# Patient Record
Sex: MALE | Race: ASIAN | NOT HISPANIC OR LATINO | Employment: OTHER | URBAN - METROPOLITAN AREA
[De-identification: names, ages, dates, MRNs, and addresses within clinical notes are randomized per-mention and may not be internally consistent; named-entity substitution may affect disease eponyms.]

---

## 2018-08-06 ENCOUNTER — AMBULATORY - HEALTHEAST (OUTPATIENT)
Dept: SLEEP MEDICINE | Facility: CLINIC | Age: 58
End: 2018-08-06

## 2018-08-06 DIAGNOSIS — I71.40 ABDOMINAL AORTIC ANEURYSM (H): ICD-10-CM

## 2018-08-06 DIAGNOSIS — R06.83 SNORING: ICD-10-CM

## 2018-09-25 ENCOUNTER — RECORDS - HEALTHEAST (OUTPATIENT)
Dept: ADMINISTRATIVE | Facility: OTHER | Age: 58
End: 2018-09-25

## 2018-09-25 ENCOUNTER — AMBULATORY - HEALTHEAST (OUTPATIENT)
Dept: CARDIOLOGY | Facility: CLINIC | Age: 58
End: 2018-09-25

## 2018-09-28 ENCOUNTER — OFFICE VISIT - HEALTHEAST (OUTPATIENT)
Dept: SLEEP MEDICINE | Facility: CLINIC | Age: 58
End: 2018-09-28

## 2018-09-28 ENCOUNTER — OFFICE VISIT - HEALTHEAST (OUTPATIENT)
Dept: FAMILY MEDICINE | Facility: CLINIC | Age: 58
End: 2018-09-28

## 2018-09-28 DIAGNOSIS — R29.818 SUSPECTED SLEEP APNEA: ICD-10-CM

## 2018-09-28 DIAGNOSIS — G47.8 SLEEP DYSFUNCTION WITH SLEEP STAGE DISTURBANCE: ICD-10-CM

## 2018-09-28 DIAGNOSIS — R03.0 ELEVATED BLOOD PRESSURE READING: ICD-10-CM

## 2018-09-28 DIAGNOSIS — R06.83 SNORING: ICD-10-CM

## 2018-09-28 LAB
ANION GAP SERPL CALCULATED.3IONS-SCNC: 14 MMOL/L (ref 5–18)
BUN SERPL-MCNC: 17 MG/DL (ref 8–22)
CHLORIDE BLD-SCNC: 103 MMOL/L (ref 98–107)
CO2 SERPL-SCNC: 26 MMOL/L (ref 22–31)
CREAT SERPL-MCNC: 1.2 MG/DL (ref 0.8–1.5)
GLUCOSE BLD-MCNC: 106 MG/DL (ref 70–125)
POTASSIUM BLD-SCNC: 3.8 MMOL/L (ref 3.5–5.5)
SODIUM SERPL-SCNC: 143 MMOL/L (ref 136–145)

## 2018-09-28 RX ORDER — BLOOD PRESSURE TEST KIT
KIT MISCELLANEOUS
Qty: 1 EACH | Refills: 0 | Status: SHIPPED | OUTPATIENT
Start: 2018-09-28

## 2018-09-28 ASSESSMENT — MIFFLIN-ST. JEOR: SCORE: 1602.8

## 2018-10-01 ENCOUNTER — OFFICE VISIT - HEALTHEAST (OUTPATIENT)
Dept: CARDIOLOGY | Facility: CLINIC | Age: 58
End: 2018-10-01

## 2018-10-01 DIAGNOSIS — I10 ESSENTIAL HYPERTENSION, BENIGN: ICD-10-CM

## 2018-10-01 DIAGNOSIS — I71.20 THORACIC AORTIC ANEURYSM WITHOUT RUPTURE (H): ICD-10-CM

## 2018-10-01 ASSESSMENT — MIFFLIN-ST. JEOR: SCORE: 1610.51

## 2018-11-02 ENCOUNTER — OFFICE VISIT - HEALTHEAST (OUTPATIENT)
Dept: CARDIOLOGY | Facility: CLINIC | Age: 58
End: 2018-11-02

## 2018-11-02 ENCOUNTER — RECORDS - HEALTHEAST (OUTPATIENT)
Dept: ADMINISTRATIVE | Facility: OTHER | Age: 58
End: 2018-11-02

## 2018-11-02 DIAGNOSIS — R03.0 ELEVATED BLOOD PRESSURE READING: ICD-10-CM

## 2018-11-02 DIAGNOSIS — I71.20 THORACIC AORTIC ANEURYSM WITHOUT RUPTURE (H): ICD-10-CM

## 2018-11-02 DIAGNOSIS — I10 ESSENTIAL HYPERTENSION, BENIGN: ICD-10-CM

## 2018-11-02 RX ORDER — LISINOPRIL 10 MG/1
10 TABLET ORAL DAILY
Qty: 90 TABLET | Refills: 3 | Status: SHIPPED | OUTPATIENT
Start: 2018-11-02 | End: 2022-02-04

## 2018-11-02 RX ORDER — METOPROLOL SUCCINATE 25 MG/1
25 TABLET, EXTENDED RELEASE ORAL DAILY
Qty: 90 TABLET | Refills: 3 | Status: SHIPPED | OUTPATIENT
Start: 2018-11-02 | End: 2022-02-04

## 2018-11-02 RX ORDER — AMLODIPINE BESYLATE 10 MG/1
10 TABLET ORAL DAILY
Qty: 90 TABLET | Refills: 3 | Status: SHIPPED | OUTPATIENT
Start: 2018-11-02

## 2018-11-02 ASSESSMENT — MIFFLIN-ST. JEOR: SCORE: 1592.37

## 2018-11-03 ENCOUNTER — RECORDS - HEALTHEAST (OUTPATIENT)
Dept: SLEEP MEDICINE | Facility: CLINIC | Age: 58
End: 2018-11-03

## 2018-11-03 ENCOUNTER — RECORDS - HEALTHEAST (OUTPATIENT)
Dept: ADMINISTRATIVE | Facility: OTHER | Age: 58
End: 2018-11-03

## 2018-11-03 DIAGNOSIS — R29.818 OTHER SYMPTOMS AND SIGNS INVOLVING THE NERVOUS SYSTEM: ICD-10-CM

## 2018-11-03 DIAGNOSIS — R06.83 SNORING: ICD-10-CM

## 2018-11-03 DIAGNOSIS — G47.8 OTHER SLEEP DISORDERS: ICD-10-CM

## 2018-11-07 ENCOUNTER — COMMUNICATION - HEALTHEAST (OUTPATIENT)
Dept: SLEEP MEDICINE | Facility: CLINIC | Age: 58
End: 2018-11-07

## 2018-11-07 DIAGNOSIS — G47.33 OSA (OBSTRUCTIVE SLEEP APNEA): ICD-10-CM

## 2018-11-14 ENCOUNTER — COMMUNICATION - HEALTHEAST (OUTPATIENT)
Dept: SLEEP MEDICINE | Facility: CLINIC | Age: 58
End: 2018-11-14

## 2019-01-11 ENCOUNTER — OFFICE VISIT - HEALTHEAST (OUTPATIENT)
Dept: SLEEP MEDICINE | Facility: CLINIC | Age: 59
End: 2019-01-11

## 2019-01-11 ENCOUNTER — AMBULATORY - HEALTHEAST (OUTPATIENT)
Dept: SLEEP MEDICINE | Facility: CLINIC | Age: 59
End: 2019-01-11

## 2019-01-11 DIAGNOSIS — G47.33 OBSTRUCTIVE SLEEP APNEA: ICD-10-CM

## 2019-01-11 DIAGNOSIS — G47.8 SLEEP DYSFUNCTION WITH SLEEP STAGE DISTURBANCE: ICD-10-CM

## 2019-01-11 DIAGNOSIS — G47.10 HYPERSOMNIA: ICD-10-CM

## 2019-01-11 ASSESSMENT — MIFFLIN-ST. JEOR: SCORE: 1604.16

## 2019-01-25 ENCOUNTER — RECORDS - HEALTHEAST (OUTPATIENT)
Dept: LAB | Facility: CLINIC | Age: 59
End: 2019-01-25

## 2019-01-25 LAB
ALBUMIN SERPL-MCNC: 3.9 G/DL (ref 3.5–5)
ALP SERPL-CCNC: 85 U/L (ref 45–120)
ALT SERPL W P-5'-P-CCNC: 31 U/L (ref 0–45)
ANION GAP SERPL CALCULATED.3IONS-SCNC: 12 MMOL/L (ref 5–18)
AST SERPL W P-5'-P-CCNC: 27 U/L (ref 0–40)
BASOPHILS # BLD AUTO: 0.1 THOU/UL (ref 0–0.2)
BASOPHILS NFR BLD AUTO: 1 % (ref 0–2)
BILIRUB SERPL-MCNC: 0.5 MG/DL (ref 0–1)
BUN SERPL-MCNC: 15 MG/DL (ref 8–22)
CALCIUM SERPL-MCNC: 9.7 MG/DL (ref 8.5–10.5)
CHLORIDE BLD-SCNC: 105 MMOL/L (ref 98–107)
CO2 SERPL-SCNC: 26 MMOL/L (ref 22–31)
CREAT SERPL-MCNC: 1.01 MG/DL (ref 0.7–1.3)
EOSINOPHIL # BLD AUTO: 0.1 THOU/UL (ref 0–0.4)
EOSINOPHIL NFR BLD AUTO: 2 % (ref 0–6)
ERYTHROCYTE [DISTWIDTH] IN BLOOD BY AUTOMATED COUNT: 13.2 % (ref 11–14.5)
GFR SERPL CREATININE-BSD FRML MDRD: >60 ML/MIN/1.73M2
GLUCOSE BLD-MCNC: 97 MG/DL (ref 70–125)
HCT VFR BLD AUTO: 43.7 % (ref 40–54)
HGB BLD-MCNC: 14.4 G/DL (ref 14–18)
LYMPHOCYTES # BLD AUTO: 2.4 THOU/UL (ref 0.8–4.4)
LYMPHOCYTES NFR BLD AUTO: 27 % (ref 20–40)
MCH RBC QN AUTO: 31.3 PG (ref 27–34)
MCHC RBC AUTO-ENTMCNC: 33 G/DL (ref 32–36)
MCV RBC AUTO: 95 FL (ref 80–100)
MONOCYTES # BLD AUTO: 0.7 THOU/UL (ref 0–0.9)
MONOCYTES NFR BLD AUTO: 8 % (ref 2–10)
NEUTROPHILS # BLD AUTO: 5.4 THOU/UL (ref 2–7.7)
NEUTROPHILS NFR BLD AUTO: 62 % (ref 50–70)
PLATELET # BLD AUTO: NORMAL THOU/UL (ref 140–440)
PMV BLD AUTO: NORMAL FL (ref 8.5–12.5)
POTASSIUM BLD-SCNC: 3.9 MMOL/L (ref 3.5–5)
PROT SERPL-MCNC: 7.8 G/DL (ref 6–8)
RBC # BLD AUTO: 4.6 MILL/UL (ref 4.4–6.2)
SODIUM SERPL-SCNC: 143 MMOL/L (ref 136–145)
WBC: 8.7 THOU/UL (ref 4–11)

## 2019-01-29 ENCOUNTER — AMBULATORY - HEALTHEAST (OUTPATIENT)
Dept: CARDIOLOGY | Facility: CLINIC | Age: 59
End: 2019-01-29

## 2019-01-29 ENCOUNTER — RECORDS - HEALTHEAST (OUTPATIENT)
Dept: ADMINISTRATIVE | Facility: OTHER | Age: 59
End: 2019-01-29

## 2019-02-01 ENCOUNTER — AMBULATORY - HEALTHEAST (OUTPATIENT)
Dept: SLEEP MEDICINE | Facility: CLINIC | Age: 59
End: 2019-02-01

## 2019-03-01 ENCOUNTER — HOSPITAL ENCOUNTER (OUTPATIENT)
Dept: MRI IMAGING | Facility: HOSPITAL | Age: 59
Discharge: HOME | End: 2019-03-01
Attending: INTERNAL MEDICINE

## 2019-03-01 DIAGNOSIS — I71.20 THORACIC AORTIC ANEURYSM WITHOUT RUPTURE (H): ICD-10-CM

## 2019-03-01 DIAGNOSIS — I10 ESSENTIAL HYPERTENSION, BENIGN: ICD-10-CM

## 2019-03-05 LAB
CCTA EJECTION FRACTION: 74 %
CCTA INTERVENTRICULAR SETPUM: 1.1 CM (ref 0.6–1.1)
CCTA LEFT INTERNAL DIMENSION IN SYSTOLE: 2.6 CM (ref 2.1–4)
CCTA LEFT VENTRICULAR INTERNAL DIMENSION IN DIASTOLE: 4.6 CM (ref 3.5–6)
CCTA LEFT VENTRICULAR MASS: 181.22 G
CCTA POSTERIOR WALL: 1.1 CM (ref 0.6–1.1)
MR CARDIAC LEFT VENTRIAL CARDIAC INDEX: 2.5 L/MIN/M2 (ref 1.7–4.2)
MR CARDIAC LEFT VENTRICAL CARDIAC OUTPUT: 4.9 L/MIN (ref 2.8–8.8)
MR CARDIAC LEFT VENTRICULAR DIASTOLIC VOLUME INDEX: 63.47 ML/M2 (ref 47–92)
MR CARDIAC LEFT VENTRICULAR MASS INDEX: 61.87 G/M2 (ref 70–113)
MR CARDIAC LEFT VENTRICULAR MASS: 119.8 G (ref 118–238)
MR CARDIAC LEFT VENTRICULAR STROKE VOLUME INDEX: 42.81 ML/M2 (ref 32–62)
MR CARDIAC LEFT VENTRICULAR SYSTOLIC VOLUME INDEX: 20.66 ML/M2 (ref 13–30)
MR EJECTION FRACTION: 67.45 %
MR HEIGHT: 1.67 M
MR LEFT VENTRICULAR DYSTOLIC VOLUMEN: 122.9 ML (ref 77–195)
MR LEFT VENTRICULAR STROKE VOLUMEN: 82.9 ML (ref 51–133)
MR LEFT VENTRICULAR SYSTOLIC VOLUME: 40 ML (ref 19–72)
MR WEIGHT: 84.6 KG

## 2019-05-06 ENCOUNTER — OFFICE VISIT - HEALTHEAST (OUTPATIENT)
Dept: CARDIOLOGY | Facility: CLINIC | Age: 59
End: 2019-05-06

## 2019-05-10 ENCOUNTER — OFFICE VISIT - HEALTHEAST (OUTPATIENT)
Dept: SLEEP MEDICINE | Facility: CLINIC | Age: 59
End: 2019-05-10

## 2019-05-10 DIAGNOSIS — G47.8 SLEEP DYSFUNCTION WITH SLEEP STAGE DISTURBANCE: ICD-10-CM

## 2019-05-10 DIAGNOSIS — G47.33 OBSTRUCTIVE SLEEP APNEA: ICD-10-CM

## 2019-05-10 DIAGNOSIS — G47.10 HYPERSOMNIA: ICD-10-CM

## 2019-05-10 ASSESSMENT — MIFFLIN-ST. JEOR: SCORE: 1624.57

## 2021-02-26 ENCOUNTER — RECORDS - HEALTHEAST (OUTPATIENT)
Dept: LAB | Facility: CLINIC | Age: 61
End: 2021-02-26

## 2021-02-26 LAB
ALBUMIN SERPL-MCNC: 3.9 G/DL (ref 3.5–5)
ALP SERPL-CCNC: 71 U/L (ref 45–120)
ALT SERPL W P-5'-P-CCNC: 28 U/L (ref 0–45)
ANION GAP SERPL CALCULATED.3IONS-SCNC: 8 MMOL/L (ref 5–18)
AST SERPL W P-5'-P-CCNC: 23 U/L (ref 0–40)
BILIRUB SERPL-MCNC: 0.5 MG/DL (ref 0–1)
BUN SERPL-MCNC: 13 MG/DL (ref 8–22)
CALCIUM SERPL-MCNC: 9 MG/DL (ref 8.5–10.5)
CHLORIDE BLD-SCNC: 105 MMOL/L (ref 98–107)
CHOLEST SERPL-MCNC: 202 MG/DL
CO2 SERPL-SCNC: 27 MMOL/L (ref 22–31)
CREAT SERPL-MCNC: 1.09 MG/DL (ref 0.7–1.3)
FASTING STATUS PATIENT QL REPORTED: YES
GFR SERPL CREATININE-BSD FRML MDRD: >60 ML/MIN/1.73M2
GLUCOSE BLD-MCNC: 95 MG/DL (ref 70–125)
HDLC SERPL-MCNC: 44 MG/DL
LDLC SERPL CALC-MCNC: 135 MG/DL
POTASSIUM BLD-SCNC: 3.8 MMOL/L (ref 3.5–5)
PROT SERPL-MCNC: 7.3 G/DL (ref 6–8)
PSA SERPL-MCNC: 0.7 NG/ML (ref 0–3.5)
SODIUM SERPL-SCNC: 140 MMOL/L (ref 136–145)
TRIGL SERPL-MCNC: 115 MG/DL

## 2021-03-01 LAB — 25(OH)D3 SERPL-MCNC: 12.4 NG/ML (ref 30–80)

## 2021-03-05 ENCOUNTER — RECORDS - HEALTHEAST (OUTPATIENT)
Dept: LAB | Facility: CLINIC | Age: 61
End: 2021-03-05

## 2021-03-06 LAB — BACTERIA SPEC CULT: NO GROWTH

## 2021-03-09 LAB
C TRACH DNA SPEC QL PROBE+SIG AMP: NEGATIVE
N GONORRHOEA DNA SPEC QL NAA+PROBE: NEGATIVE

## 2021-05-28 NOTE — PROGRESS NOTES
Dear Dr. Briseno, Sharda Liu MD  [address] [place] [id number],    Thank you for the opportunity to participate in the care of Marcelle Posadas.     He is a 58 y.o. y/o male patient who comes to the sleep medicine clinic for follow up.  This is the patient's first clinical visit since starting CPAP therapy.  The patient states that his sleep quality and energy level have both dramatically improved since starting CPAP.  His wife and children are also thankful that he no longer snores.    Compliance Download data for 30 days:  Pressure setting: APAP 8-16 CWP  Residual AHI: 0.9 events per hour  Leak: Minimal  Compliance: 63%  Mask Tolerance: Good  Skin irritation: None    Past Medical History:   Diagnosis Date     Ascending aortic aneurysm (H)      Hypertension        History reviewed. No pertinent surgical history.    Social History     Socioeconomic History     Marital status:      Spouse name: Not on file     Number of children: Not on file     Years of education: Not on file     Highest education level: Not on file   Occupational History     Not on file   Social Needs     Financial resource strain: Not on file     Food insecurity:     Worry: Not on file     Inability: Not on file     Transportation needs:     Medical: Not on file     Non-medical: Not on file   Tobacco Use     Smoking status: Never Smoker     Smokeless tobacco: Never Used   Substance and Sexual Activity     Alcohol use: Not on file     Drug use: Not on file     Sexual activity: Not on file   Lifestyle     Physical activity:     Days per week: Not on file     Minutes per session: Not on file     Stress: Not on file   Relationships     Social connections:     Talks on phone: Not on file     Gets together: Not on file     Attends [employer] service: Not on file     Active member of club or organization: Not on file     Attends meetings of clubs or organizations: Not on file     Relationship status: Not on file     Intimate partner violence:  "    Fear of current or ex partner: Not on file     Emotionally abused: Not on file     Physically abused: Not on file     Forced sexual activity: Not on file   Other Topics Concern     Not on file   Social History Narrative     Not on file       Current Outpatient Medications   Medication Sig Dispense Refill     amLODIPine (NORVASC) 10 MG tablet Take 1 tablet (10 mg total) by mouth daily. 90 tablet 3     blood pressure monitor Kit Check blood pressure 3 times a day 1 each 0     lisinopril (PRINIVIL,ZESTRIL) 10 MG tablet Take 1 tablet (10 mg total) by mouth daily. 90 tablet 3     metoprolol succinate (TOPROL XL) 25 MG Take 1 tablet (25 mg total) by mouth daily. 90 tablet 3     No current facility-administered medications for this visit.        No Known Allergies    Epworths Sleepiness Scale 9/28/2018 5/10/2019   Sitting and reading 1 1   Watching TV 0 1   Sitting, inactive in a public place (e.g. a theatre or a meeting) 1 1   As a passenger in a car for an hour without a break 0 1   Lying down to rest in the afternoon when circumstances permit 0 2   Sitting and talking to someone 1 0   Sitting quietly after a lunch without alcohol 0 1   In a car, while stopped for a few minutes in traffic 0 0   Total score 3 7       Physical Exam:  /70 (Patient Site: Right Arm, Patient Position: Sitting, Cuff Size: Adult Regular)   Pulse 62   Ht 5' 6\" (1.676 m)   Wt 191 lb 1.6 oz (86.7 kg)   SpO2 95%   BMI 30.84 kg/m    BMI:Body mass index is 30.84 kg/m .   GEN: NAD, obese  Psych: normal mood, normal affect    Labs/Studies:    I reviewed the efficacy and compliance report from his device. Data summarized on the HPI and the PAP compliance flow sheet.     Lab Results   Component Value Date    WBC 8.7 01/25/2019    HGB 14.4 01/25/2019    HCT 43.7 01/25/2019    MCV 95 01/25/2019    PLT  01/25/2019      Comment:      Clumped platelets; estimate from smear appears to be decreased          Chemistry        Component Value " Date/Time     01/25/2019 0900    K 3.9 01/25/2019 0900     01/25/2019 0900    CO2 26 01/25/2019 0900    BUN 15 01/25/2019 0900    CREATININE 1.01 01/25/2019 0900    GLU 97 01/25/2019 0900        Component Value Date/Time    CALCIUM 9.7 01/25/2019 0900    ALKPHOS 85 01/25/2019 0900    AST 27 01/25/2019 0900    ALT 31 01/25/2019 0900    BILITOT 0.5 01/25/2019 0900            No results found for: FERRITIN         Assessment and Plan:  In summary Marcelle Posadas is a 58 y.o. year old male who is here for follow-up.    1.  Obstructive sleep apnea on CPAP  I will narrow his CPAP pressure range to a set pressure of 10 CWP.  I had the patient test at this pressure setting in front of me and he felt it was comfortable.  I look forward to following up with the patient in 2 years.  2.  Hypersomnia  Improving  3.  Other sleep disturbance     Patient verbalized understanding of these issues, agrees with the plan and all questions were answered today. Patient was given an opportuntity to voice any other symptoms or concerns not listed above. Patient did not have any other symptoms or concerns.      Viktor Sellers DO  Board Certified in Internal Medicine and Sleep Medicine  [facility].      (Note created with Dragon voice recognition and unintended spelling errors and word substitutions may occur)     All the information was obtained through the help of the interpretor.

## 2021-06-02 VITALS — BODY MASS INDEX: 29.94 KG/M2 | HEIGHT: 66 IN | WEIGHT: 186.3 LBS

## 2021-06-02 VITALS — BODY MASS INDEX: 30.34 KG/M2 | HEIGHT: 66 IN | BODY MASS INDEX: 29.7 KG/M2 | WEIGHT: 188 LBS

## 2021-06-02 VITALS — HEIGHT: 66 IN | WEIGHT: 184 LBS | BODY MASS INDEX: 29.57 KG/M2

## 2021-06-02 VITALS — HEIGHT: 66 IN | WEIGHT: 186.6 LBS | BODY MASS INDEX: 29.99 KG/M2

## 2021-06-02 VITALS — WEIGHT: 188 LBS | BODY MASS INDEX: 30.22 KG/M2 | HEIGHT: 66 IN

## 2021-06-02 VITALS — WEIGHT: 185.1 LBS | BODY MASS INDEX: 29.88 KG/M2

## 2021-06-03 VITALS — WEIGHT: 191.1 LBS | HEIGHT: 66 IN | BODY MASS INDEX: 30.71 KG/M2

## 2021-06-16 PROBLEM — I71.20 THORACIC AORTIC ANEURYSM WITHOUT RUPTURE (H): Status: ACTIVE | Noted: 2018-10-01

## 2021-06-16 PROBLEM — I10 ESSENTIAL HYPERTENSION, BENIGN: Status: ACTIVE | Noted: 2018-10-01

## 2021-06-17 NOTE — PATIENT INSTRUCTIONS - HE
"Patient Instructions by Viktor Sellers DO at 1/11/2019  1:00 PM     Author: Viktor Sellers DO Service: -- Author Type: Physician    Filed: 1/11/2019  1:20 PM Encounter Date: 1/11/2019 Status: Addendum    : Viktor Sellers DO (Physician)    Related Notes: Original Note by Viktor Sellers DO (Physician) filed at 1/11/2019  1:09 PM         What is sleep apnea?    Sleep apnea is a condition that makes you stop breathing for short periods while you are asleep. There are 2 types of sleep apnea. One is called \"obstructive sleep apnea,\" and the other is called \"central sleep apnea.\"  In obstructive sleep apnea, you stop breathing because your throat narrows or closes (figure 1). In central sleep apnea, you stop breathing because your brain does not send the right signals to your muscles to make you breathe. When people talk about sleep apnea, they are usually referring to obstructive sleep apnea, which is what this article is about.  People with sleep apnea do not know that they stop breathing when they are asleep. But they do sometimes wake up startled or gasping for breath. They also often hear from loved ones that they snore.  What are the symptoms of sleep apnea? -- The main symptoms of sleep apnea are loud snoring, tiredness, and daytime sleepiness. Other symptoms can include:  ?Restless sleep  ?Waking up choking or gasping  ?Morning headaches, dry mouth, or sore throat  ?Waking up often to urinate  ?Waking up feeling unrested or groggy  ?Trouble thinking clearly or remembering things  Some people with sleep apnea don't have symptoms, or they don't know they have them. They might figure that it's normal to be tired or to snore a lot.  Should I see a doctor or nurse? -- Yes. If you think you might have sleep apnea, see your doctor.  Is there a test for sleep apnea? -- Yes. If your doctor or nurse suspects you have sleep apnea, he or she might send you for a \"sleep study.\" Sleep studies can sometimes be " "done at home, but they are usually done in a sleep lab. For the study, you spend the night in the lab, and you are hooked up to different machines that monitor your heart rate, breathing, and other body functions. The results of the test will tell your doctor or nurse if you have the disorder.  Is there anything I can do on my own to help my sleep apnea? -- Yes. Here are some things that might help:  ?Stay off your back when sleeping. (This is not always practical, because people cannot control their position while asleep. Plus, it only helps some people.)  ?Lose weight, if you are overweight  ?Avoid alcohol, because it can make sleep apnea worse  How is sleep apnea treated? -- The most effective treatment for sleep apnea is a device that keeps your airway open while you sleep. Treatment with this device is called \"continuous positive airway pressure,\" or CPAP. People getting CPAP wear a face mask at night that keeps them breathing (figure 2).  If your doctor or nurse recommends a CPAP machine, try to be patient about using it. The mask might seem uncomfortable to wear at first, and the machine might seem noisy, but using the machine can really pay off. People with sleep apnea who use a CPAP machine feel more rested and generally feel better.  There is also another device that you wear in your mouth called an \"oral appliance\" or \"mandibular advancement device.\" It also helps keep your airway open while you sleep.  In rare cases, when nothing else helps, doctors recommend surgery to keep the airway open. Surgery to do this is not always effective, and even when it is, the problem can come back.  Is sleep apnea dangerous? -- It can be. People with sleep apnea do not get good-quality sleep, so they are often tired and not alert. This puts them at risk for car accidents and other types of accidents. Plus, studies show that people with sleep apnea are more likely than others to have high blood pressure, heart attacks, " and other serious heart problems. In people with severe sleep apnea, getting treated (for example, with a CPAP machine) can help prevent some of these problems.    GRAPHICS  Airway in a person with sleep apnea    Normally when a person sleeps, the airway remains open, and air can pass from the nose and mouth to the lungs. In a person with sleep apnea, parts of the throat and mouth drop into the airway and block off the flow of air. This can cause loud snoring and interrupt breathing for short periods.  Graphic 43707 Version 5.0    Continuous positive airway pressure (CPAP) for sleep apnea    The CPAP mask gently blows air into your nose while you sleep. It puts just enough pressure on your airway to keep it from closing. The mask in this picture fits over just the nose. Other CPAP devices have masks that fit over the nose and mouth.    Equipment Instructions    We will process your PAP order and send it to a Durable Medical Equipment (DME) provider.    The medical equipment company should call you within 7 days.  If you have not heard from the company, please contact them to see if they received your order and are planning to call you.    Please call us at [phone] if you are unable to contact the medical equipment company or if they do not have the order.    If you are starting a new PAP machine, please call us after you use it the first night to let us know how it went. This call also helps us know that you received your equipment and that everything is ready. Please use our central phone number [phone]    Contact information for [employer] company:    [employer] Tel: [phone]    Please bring your machine, mask, hose and power cord on the next clinical visit with me. Thank you.

## 2021-06-17 NOTE — PATIENT INSTRUCTIONS - HE
"Patient Instructions by Viktor Sellers DO at 5/10/2019  9:00 AM     Author: Viktor Sellers DO Service: -- Author Type: Physician    Filed: 5/10/2019  9:06 AM Encounter Date: 5/10/2019 Status: Signed    : Viktor Sellers DO (Physician)         SLEEP HYGIENE    Sleep only as much as you need to feel rested and then get out of bed   Keep a regular sleep schedule   Avoid forcing sleep   Exercise regularly for at least 20 minutes, preferably 4 to 5 hours before bedtime   Avoid caffeinated beverages after lunch   Avoid alcohol near bedtime: no \"night cap\"   Avoid smoking, especially in the evening   Do not go to bed hungry   Adjust bedroom environment   Avoid prolonged use of light-emitting screens before bedtime    Deal with your worries before bedtime              "

## 2021-06-20 NOTE — PROGRESS NOTES
Assessment:     1. Elevated blood pressure reading  ISTAT CHEM 7 (HE CLINIC ONLY)    lisinopril (PRINIVIL,ZESTRIL) 10 MG tablet    hydroCHLOROthiazide (HYDRODIURIL) 25 MG tablet    amLODIPine (NORVASC) 5 MG tablet    blood pressure monitor Kit     Results for orders placed or performed in visit on 09/28/18   ISTAT CHEM 7 (HE CLINIC ONLY)   Result Value Ref Range    Sodium 143 136 - 145 mmol/L    Potassium 3.8 3.5 - 5.5 mmol/L    CO2 26 22 - 31 mmol/L    Chloride 103 98 - 107 mmol/L    Anion Gap, Calculation 14 5 - 18 mmol/L    Glucose 106 70 - 125 mg/dL    BUN 17 8 - 22 mg/dL    Creatinine 1.20 0.80 - 1.50 mg/dL            Plan:     Differential diagnosis include but not limited to elevated blood pressure without history of hypertension, hypertension.  Discussed with the patient about the findings, with his elevated blood pressure but he is a symptomatic.  I discussed with the patient about going to the emergency room.  Patient was resistant therefore I decided to start him on triple therapy for hypertension.  Advised patient that today we will start him on lisinopril 10 mg, hydrochlorothiazide 20 mg, and amlodipine 5 mg all of the medications daily.  Patient is to monitor for any side effects of the medications.  He need to be compliant with his medications.  I personally scheduled an appointment with him at entire clinic with Dr. He for follow-up.  I also ordered a blood pressure monitor machine for the patient he is advised to keep a log of his blood pressure and to take his blood pressure 3 times a day.  Advised patient that if he experiences any symptoms including visual problems, occipital headaches, or any weakness he need to go to the emergency room immediately.  Patient and his wife verbalized understanding the plan of care.     Subjective:       58 y.o. male presents for evaluation of elevated blood pressure.  Patient was referred to the walk-in clinic from the sleep clinic due to blood pressure  diastolic over 200.  Currently patient here in the clinic with elevated blood pressure.  He is a symptomatic, he denies any headaches, visual disturbance, no nausea, vomiting, or diarrhea.  Denies any shortness of breath.  Patient reports that he drinks 1 cup of coffee a day.  He is non-smoker, no family history of hypertension.  Reviewing patient blood pressure in 2015 he had elevated blood pressure but was never started on medication, he reports that his blood pressure was well controlled.  Currently he is not taking any medications.  And he denies any medical problems including diabetes, hyperlipidemia, hypertension.    The following portions of the patient's history were reviewed and updated as appropriate: allergies, current medications, past family history, past medical history, past social history, past surgical history and problem list.    Review of Systems  A 12 point comprehensive review of systems was negative except as noted.     Objective:      BP (!) 201/116 (Patient Site: Right Arm, Patient Position: Sitting, Cuff Size: Adult Large)  Pulse 68  Temp 98.9  F (37.2  C) (Oral)   Resp 20  Wt 185 lb 1.6 oz (84 kg)  SpO2 95%  BMI 29.88 kg/m2  General appearance: alert, appears stated age, cooperative and moderate distress  Head: Normocephalic, without obvious abnormality, atraumatic  Eyes: conjunctivae/corneas clear. PERRL, EOM's intact. Fundi benign.  Ears: normal TM's and external ear canals both ears  Throat: lips, mucosa, and tongue normal; teeth and gums normal  Lungs: clear to auscultation bilaterally  Heart: regular rate and rhythm, S1, S2 normal, no murmur, click, rub or gallop  Extremities: extremities normal, atraumatic, no cyanosis or edema  Pulses: 2+ and symmetric  Skin: Skin color, texture, turgor normal. No rashes or lesions  Lymph nodes: Cervical, supraclavicular, and axillary nodes normal.  Neurologic: Grossly normal     This note has been dictated using voice recognition software. Any  grammatical or context distortions are unintentional and inherent to the software

## 2021-06-20 NOTE — PROGRESS NOTES
Dear Dr. Sharda Briseno Md  [address] [place] [address]    Thank you for the opportunity to participate in the care of Mr. Marcelle Posadas.    He is a 58 y.o. male who comes to the clinic with a chief complaint of witnessed apnea that is been going on for at least 2 years.  While the patient denies any episodes of excessive daytime sleepiness he has been informed by his wife that he has significant pauses in his breathing during sleep followed by loud snoring.  The patient's blood pressure was excessively elevated during this clinic visit.  He currently denies any headaches, chest pain, shortness of breath, or double vision at this point in time.  The patient's other review of system is otherwise unremarkable.     Ideal Sleep-Wake Cycle(devoid of societal pressure):    Patient would try to initiate sleep at around 10 PM with a sleep latency of 10-15 minutes. The patient would have 2 nocturnal awakening. Final wake up time is around 7 AM.    Past Medical History  No past medical history on file.     Past Surgical History  No past surgical history on file.     Meds  No current outpatient prescriptions on file.     No current facility-administered medications for this visit.         Allergies  Review of patient's allergies indicates no known allergies.     Social History  Social History     Social History     Marital status:      Spouse name: N/A     Number of children: N/A     Years of education: N/A     Occupational History     Not on file.     Social History Main Topics     Smoking status: Never Smoker     Smokeless tobacco: Never Used     Alcohol use Not on file     Drug use: Not on file     Sexual activity: Not on file     Other Topics Concern     Not on file     Social History Narrative     No narrative on file        Family History  No family history on file.     Review of Systems:  Constitutional: Negative except as noted in HPI.   Eyes: Negative except as noted in HPI.   ENT: Negative except  "as noted in HPI.   Cardiovascular: Negative except as noted in HPI.   Respiratory: Negative except as noted in HPI.   Gastrointestinal: Negative except as noted in HPI.   Genitourinary: Negative except as noted in HPI.   Musculoskeletal: Negative except as noted in HPI.   Integumentary: Negative except as noted in HPI.   Neurological: Negative except as noted in HPI.   Psychiatric: Negative except as noted in HPI.   Endocrine: Negative except as noted in HPI.   Hematologic/Lymphatic: Negative except as noted in HPI.     STOP BANG 9/28/2018   Do you snore loudly (louder than talking or loud enough to be heard through closed doors)? 1   Do you often feel tired, fatigued, or sleepy during daytime? 0   Has anyone observed you stop breathing in your sleep? 1   Do you have or are you being treated for high blood pressure? 0   BMI more than 35 kg/m2 0   Age over 50 years old? 1   Neck circumference greater than 16 inches? 0   Gender male? 1   Total Score 4   Epworths Sleepiness Scale 9/28/2018   Sitting and reading 1   Watching TV 0   Sitting, inactive in a public place (e.g. a theatre or a meeting) 1   As a passenger in a car for an hour without a break 0   Lying down to rest in the afternoon when circumstances permit 0   Sitting and talking to someone 1   Sitting quietly after a lunch without alcohol 0   In a car, while stopped for a few minutes in traffic 0   Total score 3   Rooming 9/28/2018   Usual bedtime 10pm   Sleep Latency 10-15min   Awakenings 1   Wake Up Time 510am   Weekends 10am   Energy Drinks 0   Coffee 1 cup   Cola n   Difficulty falling asleep No   Difficulty staying asleep No   Excessive daytime tiredness No   Excessive daytime sleepiness No   Dozing off while driving No   Shift Worker No   Sleep Walking? No   Sleep Talking? No   Kicking or punching? No   Restless legs symptoms No        Physical Exam:  BP (!) 212/140  Pulse 78  Ht 5' 6\" (1.676 m)  Wt 186 lb 4.8 oz (84.5 kg)  SpO2 96%  BMI 30.07 " kg/m2  BMI:Body mass index is 30.07 kg/(m^2).   GEN: NAD, obese  Head: Normocephalic.  EYES: PERRLA, EOMI  ENT: Oropharynx is clear, mallampatti class 3+ airway. Uvula is intact  Nasal mucosa is moist without erythema  Neck : Thyroid is within normal limits. Neck size 16in  CV: Regular rate and rhythm, S1 & S2 positive.  LUNGS: Bilateral breathsounds heard.   ABDOMEN: Positive bowel sounds in all quadrants, soft, no rebound or guarding  MUSCULOSKELETAL: Bilateral trace leg swelling  SKIN: warm, dry, no rashes  Neurological: Alert, oriented to time, place, and person.  Psych: normal mood, normal affect     Labs/Studies:     No results found for: WBC, HGB, HCT, MCV, PLT      Chemistry    No results found for: NA, K, CL, CO2, BUN, CREATININE, GLU No results found for: CALCIUM, ALKPHOS, AST, ALT, BILITOT         No results found for: FERRITIN  No results found for: TSH      Assessment and Plan:  In summary Marcelle Posadas is a 58 y.o. year old male here for sleep disturbance.  1.  Suspected sleep apnea   Mr. Marcelle Posadas has high risk for obstructive sleep apnea based on the history of witnessed apnea, snoring and a crowded airway. I educated the patient on the underlying pathophysiology of obstructive sleep apnea. We reviewed the risks associated with sleep apnea, including increased cardiovascular risk and overall death. We talked about treatments briefly. I recommend getting an split-night nocturnal polysomnography. The patient should return to the clinic to discuss results and treatment option in a patient-centered approach.  2.  Snoring  3.  Other sleep disturbance  4.  Elevated blood pressure reading  I wanted to call the patient in ambulance to send him to be evaluated by the emergency room.  However the patient declined due to the cost of the transport.  We compromised on him driving to the urgent care clinic for evaluation.  He promises to drive directly to the [place] walk-in clinic after his clinic visit with me.   I will have my staff call the walk-in clinic to informed them of the patient's pending arrival.    Patient verbalized understanding of these issues, agrees with the plan and all questions were answered today. Patient was given an opportuntity to voice any other symptoms or concerns not listed above. Patient did not have any other symptoms or concerns.      Patient told to return in one week after the sleep study is interpreted.      Viktor Sellers DO  Board Certified in Internal Medicine and Sleep Medicine  [facility].    (Note created with Dragon voice recognition and unintended spelling errors and word substitutions may occur)

## 2021-06-23 NOTE — PROGRESS NOTES
"Dear Dr. Briseno, Shrada Liu MD  [address] [place] [id number],    Thank you for the opportunity to participate in the care of Marcelle Posadas.     He is a 58 y.o.  male patient who comes to the sleep medicine clinic for review of his sleep study. The study was completed on 11/02/18 which showed the the patient had severe obstructive sleep apnea.    Current Outpatient Medications   Medication Sig Dispense Refill     amLODIPine (NORVASC) 10 MG tablet Take 1 tablet (10 mg total) by mouth daily. 90 tablet 3     blood pressure monitor Kit Check blood pressure 3 times a day 1 each 0     lisinopril (PRINIVIL,ZESTRIL) 10 MG tablet Take 1 tablet (10 mg total) by mouth daily. 90 tablet 3     metoprolol succinate (TOPROL XL) 25 MG Take 1 tablet (25 mg total) by mouth daily. 90 tablet 3     No current facility-administered medications for this visit.        No Known Allergies    Epworths Sleepiness Scale 9/28/2018   Sitting and reading 1   Watching TV 0   Sitting, inactive in a public place (e.g. a theatre or a meeting) 1   As a passenger in a car for an hour without a break 0   Lying down to rest in the afternoon when circumstances permit 0   Sitting and talking to someone 1   Sitting quietly after a lunch without alcohol 0   In a car, while stopped for a few minutes in traffic 0   Total score 3       Physical Exam:  /88   Pulse (!) 113   Ht 5' 6\" (1.676 m)   Wt 186 lb 9.6 oz (84.6 kg)   SpO2 97%   BMI 30.12 kg/m    BMI:Body mass index is 30.12 kg/m .   GEN: NAD, obese  Psych: normal mood, normal affect     Labs/Studies:  - We reviewed the results of the overnight PSG as described on the HPI.     No results found for: WBC, HGB, HCT, MCV, PLT      Chemistry        Component Value Date/Time     09/28/2018 1600    K 3.8 09/28/2018 1600     09/28/2018 1600    CO2 26 09/28/2018 1600    BUN 17 09/28/2018 1600    CREATININE 1.20 09/28/2018 1600     09/28/2018 1600    No results found for: CALCIUM, " ALKPHOS, AST, ALT, BILITOT         No results found for: FERRITIN        Assessment and Plan:  In summary Marcelle Posadas is a 58 y.o. year old male here for review of his sleep study.  1. Obstructive Sleep Apnea  We had an extensive conversation to review the results of his sleep study and to  him on the importance of treating sleep apnea. Patient decided to proceed with CPAP. He will start using the device as soon as he receives it with the intention to use if for the entire night. We discussed some tips to increase PAP tolerance as well as the normal curve of adaptation. CPAP is going to provide improved respiratory function during the night but it can cause some sleep disruption that tends to improve with continuous usage. He should return to the clinic in 6 weeks to review compliance and efficacy monitoring. Since the patient does not have any preference with regards to the durable medical equipment company, he is willing to use [employer].  2. Hypersomnia  3. Other sleep disturbance       Patient verbalized understanding of these issues, agrees with the plan and all questions were answered today. Patient was given an opportuntity to voice any other symptoms or concerns not listed above. Patient did not have any other symptoms or concerns.        Viktor Sellers DO  Board Certified in Internal Medicine and Sleep Medicine  [facility].    We spent a total of 15 minutes of face-to-face encounter and more than 50% of the encounter was used for counseling or coordination of care.    (Note created with Dragon voice recognition and unintended spelling errors and word substitutions may occur)     All the information was obtained through the help of the .

## 2021-06-23 NOTE — PROGRESS NOTES
Patient was offered choice of vendor and chose [facility].  Patient Marcelle L Posadas was set up at [facility] on 2/1/19. Patient received a Resmed [id number] Auto. Pressures were set at 8-16.   Patient s ramp is 5 cm H2O for Auto and FLEX/EPR is EPR 2.  Patient received a Resmed Mask name: Airfit P10  pillow Size med, heated tubing and heated humidifier.    Pacee Her

## 2021-06-23 NOTE — PROGRESS NOTES
Order for Durable Medical Equipment was processed and equipment ordered.     DME provider: Jonna    Date Faxed: 1/11/19    Ordering Provider: Dr. Sellers    Equipment ordered: APAP    Fax Number: In house DME/[facility]

## 2021-06-25 ENCOUNTER — RECORDS - HEALTHEAST (OUTPATIENT)
Dept: LAB | Facility: CLINIC | Age: 61
End: 2021-06-25

## 2021-06-26 NOTE — PROGRESS NOTES
Progress Notes by Yoan Head DO at 10/1/2018  1:50 PM     Author: Yoan Head DO Service: -- Author Type: Physician    Filed: 10/1/2018  4:18 PM Encounter Date: 10/1/2018 Status: Signed    : Yoan Head DO (Physician)           Click to link to [facility] HEART CARE NOTE    Thank you, Dr. Briseno, for asking the [facility] Care team to see Mr. Marcelle Posadas to evaluate ascending aortic aneurysm.      Assessment/Recommendations   Assessment:    1. Ascending thoracic Aneurysm (46 mm by CT chest).  Valve is document is trileaflet by echo.  2.  Essential hypertension    Plan:  1.  Start metoprolol XL 25 mg daily, increase as tolerated   2.  Continue hydrochlorothiazide  3.  Continue lisinopril 10 mg daily, titrate as tolerated  4.  Continue amlodipine, plan to discontinue in the future  5.  Recommend cardiac MRI at 6-months, then yearly intervals.  Recommend cardiac MRI with MRA given patient is young and avoid significant radiation exposure over his lifetime. Would not recommend transthoracic echo as a screening modality for this patient's ascending aortic aneurysm.       History of Present Illness    Mr. Marcelle Posadas is a 58 y.o. male with hypertension who presents to cardiology clinic for assessment of ascending aortic aneurysm.       Into the patient was recently diagnosed with a ascending aortic aneurysm by echo.  This is followed by noncontrast CT of the chest which demonstrated 46 mm ascending aortic aneurysm.  According the patient he has no current headache symptoms and denies any chest pain, dyspnea on exertion, lightheadedness, near syncope lower extremity edema or palpitations.    He was recently evaluated in the emergency department for elevated blood pressure which tach mentation was reviewed.  He was started on hydrochlorothiazide, amlodipine and lisinopril.  He has not noted any side effects from his medications.  His blood pressure  is well-controlled today.  Given he has an ascending aortic aneurysm would recommend switching to beta-blocker and ACE inhibitor therapy.    There is no family history of a sending aortic aneurysm.  There is no family history of sudden cardiac death.  The patient denies any prior surgeries.    ECHO (personnaly reviewed): 9/25/2018     Physical Examination Review of Systems   Vitals:    10/01/18 1408   BP: 130/70   Pulse: 68   Resp: 14     Body mass index is 30.34 kg/(m^2).  Wt Readings from Last 3 Encounters:   10/01/18 188 lb (85.3 kg)   09/28/18 185 lb 1.6 oz (84 kg)   09/28/18 186 lb 4.8 oz (84.5 kg)       General Appearance:   no distress, normal body habitus   ENT/Mouth: membranes moist, no oral lesions or bleeding gums.      EYES:  no scleral icterus, normal conjunctivae   Neck: no carotid bruits or thyromegaly   Chest/Lungs:   lungs are clear to auscultation, no rales or wheezing, no sternal scar, equal chest wall expansion    Cardiovascular:   Regular. Normal first and second heart sounds with no murmurs, rubs, or gallops; the carotid, radial and posterior tibial pulses are intact, Jugular venous pressure normal, no edema bilaterally    Abdomen:  no organomegaly, masses, bruits, or tenderness; bowel sounds are present   Extremities: no cyanosis or clubbing   Skin: no xanthelasma, warm.    Neurologic: normal gait, normal  bilateral, no tremors     Psychiatric: alert and oriented x3, calm     General: WNL  Eyes: WNL  Ears/Nose/Throat: WNL  Lungs: WNL  Heart: WNL  Stomach: WNL  Bladder: WNL  Muscle/Joints: WNL  Skin: WNL  Nervous System: WNL  Mental Health: WNL     Blood: WNL     Medical History  Surgical History Family History Social History   Past Medical History:   Diagnosis Date   ? Ascending aortic aneurysm (H)    ? Hypertension     No prior cardiac surgeries Family History   Problem Relation Age of Onset   ? Cancer Father     Social History     Social History   ? Marital status:      Spouse  name: N/A   ? Number of children: N/A   ? Years of education: N/A     Occupational History   ? Not on file.     Social History Main Topics   ? Smoking status: Never Smoker   ? Smokeless tobacco: Never Used   ? Alcohol use Not on file   ? Drug use: Not on file   ? Sexual activity: Not on file     Other Topics Concern   ? Not on file     Social History Narrative          Medications  Allergies   Current Outpatient Prescriptions   Medication Sig Dispense Refill   ? amLODIPine (NORVASC) 5 MG tablet Take 1 tablet (5 mg total) by mouth daily. 30 tablet 0   ? blood pressure monitor Kit Check blood pressure 3 times a day 1 each 0   ? hydroCHLOROthiazide (HYDRODIURIL) 25 MG tablet Take 1 tablet (25 mg total) by mouth daily. 30 tablet 0   ? lisinopril (PRINIVIL,ZESTRIL) 10 MG tablet Take 1 tablet (10 mg total) by mouth daily. 30 tablet 0     No current facility-administered medications for this visit.       No Known Allergies      Lab Results    Chemistry/lipid CBC Cardiac Enzymes/BNP/TSH/INR   Lab Results   Component Value Date    CREATININE 1.20 09/28/2018    BUN 17 09/28/2018    K 3.8 09/28/2018     09/28/2018     09/28/2018    CO2 26 09/28/2018    No results found for: WBC, HGB, HCT, MCV, PLT No results found for: CKTOTAL, CKMB, CKMBINDEX, TROPONINI, BNP, TSH, INR

## 2021-06-26 NOTE — PROGRESS NOTES
Progress Notes by Yoan Head DO at 11/2/2018 10:30 AM     Author: Yoan Head DO Service: -- Author Type: Physician    Filed: 11/2/2018 11:06 AM Encounter Date: 11/2/2018 Status: Signed    : Yoan Head DO (Physician)           Click to link to [facility] HEART CARE NOTE    Thank you, Dr. Briseno, for asking the [facility] Heart Care team to see Mr. Marcelle Posadas to evaluate ascending aortic aneurysm.      Assessment/Recommendations   Assessment:    1. Ascending thoracic Aneurysm (46 mm by CT chest).  Valve is document is trileaflet by echo.  2.  Essential hypertension, improving control.       Plan:  1.  Continue metoprolol XL 25 mg daily,  2.  Continue lisinopril 10 mg daily, titrate as tolerated  3.  Continue amlodipine 10 mg daily.   4.  Recommend cardiac MRI at 6-months (March 2019), then yearly intervals.  Recommend cardiac MRI with MRA given patient is young and avoid significant radiation exposure over his lifetime       History of Present Illness    Mr. Marcelle Posadas is a 58 y.o. male with hypertension who presents to cardiology clinic for assessment of ascending aortic aneurysm.       Since last evaluation the patient's blood pressure has gradually improved.  He has monitor his blood pressure at [facility] and his systolic blood pressures been around 110.  Repeat blood pressure today demonstrated blood pressure of 130/78.  We are increasing his amlodipine today.  He will continue on his beta-blocker therapy and lisinopril.  Denies any chest pain symptoms lower extremity edema orthopnea or PND symptoms.  Tolerating medication without side effects.   was present during history and physical.       Physical Examination Review of Systems   Vitals:    11/02/18 1039   BP: 142/90   Pulse: 65   Resp: 12     Body mass index is 29.7 kg/(m^2).  Wt Readings from Last 3 Encounters:   11/02/18 184 lb (83.5 kg)   10/01/18 188 lb (85.3 kg)   09/28/18  185 lb 1.6 oz (84 kg)       General Appearance:   no distress, normal body habitus   ENT/Mouth: membranes moist, no oral lesions or bleeding gums.      EYES:  no scleral icterus, normal conjunctivae   Neck: no carotid bruits or thyromegaly   Chest/Lungs:   lungs are clear to auscultation, no rales or wheezing, no sternal scar, equal chest wall expansion    Cardiovascular:   Regular. Normal first and second heart sounds with no murmurs, rubs, or gallops; the carotid, radial and posterior tibial pulses are intact, Jugular venous pressure normal, no edema bilaterally    Abdomen:  no organomegaly, masses, bruits, or tenderness; bowel sounds are present   Extremities: no cyanosis or clubbing   Skin: no xanthelasma, warm.    Neurologic: normal gait, normal  bilateral, no tremors     Psychiatric: alert and oriented x3, calm     General: WNL  Eyes: WNL  Ears/Nose/Throat: WNL  Lungs: Cough, Snoring  Heart: WNL  Stomach: WNL  Bladder: WNL  Muscle/Joints: WNL  Skin: WNL  Nervous System: WNL  Mental Health: WNL     Blood: WNL     Medical History  Surgical History Family History Social History   Past Medical History:   Diagnosis Date   ? Ascending aortic aneurysm (H)    ? Hypertension     No prior cardiac surgeries Family History   Problem Relation Age of Onset   ? Cancer Father     Social History     Social History   ? Marital status:      Spouse name: N/A   ? Number of children: N/A   ? Years of education: N/A     Occupational History   ? Not on file.     Social History Main Topics   ? Smoking status: Never Smoker   ? Smokeless tobacco: Never Used   ? Alcohol use Not on file   ? Drug use: Not on file   ? Sexual activity: Not on file     Other Topics Concern   ? Not on file     Social History Narrative          Medications  Allergies   Current Outpatient Prescriptions   Medication Sig Dispense Refill   ? blood pressure monitor Kit Check blood pressure 3 times a day 1 each 0   ? lisinopril (PRINIVIL,ZESTRIL) 10 MG  tablet Take 1 tablet (10 mg total) by mouth daily. 30 tablet 0   ? metoprolol succinate (TOPROL XL) 25 MG Take 1 tablet (25 mg total) by mouth daily. 90 tablet 3   ? amLODIPine (NORVASC) 5 MG tablet Take 1 tablet (5 mg total) by mouth daily. 30 tablet 0     No current facility-administered medications for this visit.       No Known Allergies      Lab Results    Chemistry/lipid CBC Cardiac Enzymes/BNP/TSH/INR   Lab Results   Component Value Date    CREATININE 1.20 09/28/2018    BUN 17 09/28/2018    K 3.8 09/28/2018     09/28/2018     09/28/2018    CO2 26 09/28/2018    No results found for: WBC, HGB, HCT, MCV, PLT No results found for: CKTOTAL, CKMB, CKMBINDEX, TROPONINI, BNP, TSH, INR

## 2021-06-28 LAB — BACTERIA SPEC CULT: ABNORMAL

## 2021-09-03 ENCOUNTER — LAB REQUISITION (OUTPATIENT)
Dept: LAB | Facility: CLINIC | Age: 61
End: 2021-09-03

## 2021-09-03 DIAGNOSIS — I10 ESSENTIAL (PRIMARY) HYPERTENSION: ICD-10-CM

## 2021-09-03 DIAGNOSIS — Z13.6 ENCOUNTER FOR SCREENING FOR CARDIOVASCULAR DISORDERS: ICD-10-CM

## 2021-09-03 DIAGNOSIS — R30.0 DYSURIA: ICD-10-CM

## 2021-09-03 DIAGNOSIS — E55.9 VITAMIN D DEFICIENCY, UNSPECIFIED: ICD-10-CM

## 2021-09-03 LAB
ALBUMIN SERPL-MCNC: 4 G/DL (ref 3.5–5)
ALP SERPL-CCNC: 78 U/L (ref 45–120)
ALT SERPL W P-5'-P-CCNC: 21 U/L (ref 0–45)
ANION GAP SERPL CALCULATED.3IONS-SCNC: 13 MMOL/L (ref 5–18)
AST SERPL W P-5'-P-CCNC: 23 U/L (ref 0–40)
BILIRUB SERPL-MCNC: 0.4 MG/DL (ref 0–1)
BUN SERPL-MCNC: 21 MG/DL (ref 8–22)
CALCIUM SERPL-MCNC: 9.6 MG/DL (ref 8.5–10.5)
CHLORIDE BLD-SCNC: 109 MMOL/L (ref 98–107)
CHOLEST SERPL-MCNC: 195 MG/DL
CO2 SERPL-SCNC: 21 MMOL/L (ref 22–31)
CREAT SERPL-MCNC: 1.38 MG/DL (ref 0.7–1.3)
GFR SERPL CREATININE-BSD FRML MDRD: 55 ML/MIN/1.73M2
GLUCOSE BLD-MCNC: 90 MG/DL (ref 70–125)
HDLC SERPL-MCNC: 46 MG/DL
LDLC SERPL CALC-MCNC: 136 MG/DL
POTASSIUM BLD-SCNC: 4.4 MMOL/L (ref 3.5–5)
PROT SERPL-MCNC: 7.7 G/DL (ref 6–8)
SODIUM SERPL-SCNC: 143 MMOL/L (ref 136–145)
TRIGL SERPL-MCNC: 66 MG/DL

## 2021-09-03 PROCEDURE — 80053 COMPREHEN METABOLIC PANEL: CPT | Performed by: NURSE PRACTITIONER

## 2021-09-03 PROCEDURE — 87491 CHLMYD TRACH DNA AMP PROBE: CPT | Performed by: NURSE PRACTITIONER

## 2021-09-03 PROCEDURE — 36415 COLL VENOUS BLD VENIPUNCTURE: CPT | Performed by: NURSE PRACTITIONER

## 2021-09-03 PROCEDURE — 87591 N.GONORRHOEAE DNA AMP PROB: CPT | Performed by: NURSE PRACTITIONER

## 2021-09-03 PROCEDURE — 80061 LIPID PANEL: CPT | Performed by: NURSE PRACTITIONER

## 2021-09-03 PROCEDURE — 82306 VITAMIN D 25 HYDROXY: CPT | Performed by: NURSE PRACTITIONER

## 2021-09-03 PROCEDURE — 87086 URINE CULTURE/COLONY COUNT: CPT | Performed by: NURSE PRACTITIONER

## 2021-09-04 LAB — BACTERIA UR CULT: NO GROWTH

## 2021-09-05 LAB
C TRACH DNA SPEC QL NAA+PROBE: NEGATIVE
N GONORRHOEA DNA SPEC QL NAA+PROBE: NEGATIVE

## 2021-09-06 LAB — DEPRECATED CALCIDIOL+CALCIFEROL SERPL-MC: 24 UG/L (ref 30–80)

## 2021-12-27 ENCOUNTER — TELEPHONE (OUTPATIENT)
Dept: SLEEP MEDICINE | Facility: CLINIC | Age: 61
End: 2021-12-27
Payer: COMMERCIAL

## 2021-12-27 DIAGNOSIS — G47.33 OSA (OBSTRUCTIVE SLEEP APNEA): ICD-10-CM

## 2021-12-27 DIAGNOSIS — I10 ESSENTIAL HYPERTENSION, BENIGN: ICD-10-CM

## 2021-12-27 DIAGNOSIS — I71.20 THORACIC AORTIC ANEURYSM WITHOUT RUPTURE (H): Primary | ICD-10-CM

## 2021-12-27 NOTE — TELEPHONE ENCOUNTER
Reason for Call:  Other call back    Detailed comments: patient needs equipment for his cpap    Phone Number Patient can be reached at: Home number on file [phone] (home)    Best Time: anytime  After 3:30    Can we leave a detailed message on this number? YES    Call taken on 12/27/2021 at 12:32 PM by Carolina Miranda

## 2022-01-01 ENCOUNTER — LAB REQUISITION (OUTPATIENT)
Dept: LAB | Facility: CLINIC | Age: 62
End: 2022-01-01

## 2022-01-01 DIAGNOSIS — K12.2 CELLULITIS AND ABSCESS OF MOUTH: ICD-10-CM

## 2022-01-01 PROCEDURE — 87081 CULTURE SCREEN ONLY: CPT | Performed by: FAMILY MEDICINE

## 2022-01-03 LAB — BACTERIA SPEC CULT: NORMAL

## 2022-01-14 ENCOUNTER — LAB REQUISITION (OUTPATIENT)
Dept: LAB | Facility: CLINIC | Age: 62
End: 2022-01-14

## 2022-01-14 DIAGNOSIS — M25.475 EFFUSION, LEFT FOOT: ICD-10-CM

## 2022-01-14 LAB
ALBUMIN SERPL-MCNC: 3.6 G/DL (ref 3.5–5)
ALP SERPL-CCNC: 72 U/L (ref 45–120)
ALT SERPL W P-5'-P-CCNC: 31 U/L (ref 0–45)
ANION GAP SERPL CALCULATED.3IONS-SCNC: 11 MMOL/L (ref 5–18)
AST SERPL W P-5'-P-CCNC: 28 U/L (ref 0–40)
BILIRUB SERPL-MCNC: 0.4 MG/DL (ref 0–1)
BUN SERPL-MCNC: 15 MG/DL (ref 8–22)
CALCIUM SERPL-MCNC: 9.1 MG/DL (ref 8.5–10.5)
CHLORIDE BLD-SCNC: 107 MMOL/L (ref 98–107)
CO2 SERPL-SCNC: 23 MMOL/L (ref 22–31)
CREAT SERPL-MCNC: 1.22 MG/DL (ref 0.7–1.3)
GFR SERPL CREATININE-BSD FRML MDRD: 68 ML/MIN/1.73M2
GLUCOSE BLD-MCNC: 115 MG/DL (ref 70–125)
POTASSIUM BLD-SCNC: 3.5 MMOL/L (ref 3.5–5)
PROT SERPL-MCNC: 7.2 G/DL (ref 6–8)
SODIUM SERPL-SCNC: 141 MMOL/L (ref 136–145)
URATE SERPL-MCNC: 8.7 MG/DL (ref 3–8)

## 2022-01-14 PROCEDURE — 80053 COMPREHEN METABOLIC PANEL: CPT | Performed by: FAMILY MEDICINE

## 2022-01-14 PROCEDURE — 84550 ASSAY OF BLOOD/URIC ACID: CPT | Performed by: FAMILY MEDICINE

## 2022-01-14 PROCEDURE — 82040 ASSAY OF SERUM ALBUMIN: CPT | Performed by: FAMILY MEDICINE

## 2022-02-04 ENCOUNTER — TELEPHONE (OUTPATIENT)
Dept: CARDIOLOGY | Facility: CLINIC | Age: 62
End: 2022-02-04

## 2022-02-04 ENCOUNTER — OFFICE VISIT (OUTPATIENT)
Dept: CARDIOLOGY | Facility: CLINIC | Age: 62
End: 2022-02-04
Attending: INTERNAL MEDICINE
Payer: COMMERCIAL

## 2022-02-04 VITALS
DIASTOLIC BLOOD PRESSURE: 82 MMHG | RESPIRATION RATE: 16 BRPM | SYSTOLIC BLOOD PRESSURE: 128 MMHG | HEART RATE: 62 BPM | WEIGHT: 187 LBS | BODY MASS INDEX: 30.05 KG/M2 | HEIGHT: 66 IN

## 2022-02-04 DIAGNOSIS — I71.20 THORACIC AORTIC ANEURYSM WITHOUT RUPTURE (H): ICD-10-CM

## 2022-02-04 DIAGNOSIS — I71.21 ASCENDING AORTIC ANEURYSM (H): ICD-10-CM

## 2022-02-04 DIAGNOSIS — R03.0 ELEVATED BLOOD PRESSURE READING: ICD-10-CM

## 2022-02-04 DIAGNOSIS — I10 BENIGN ESSENTIAL HYPERTENSION: ICD-10-CM

## 2022-02-04 DIAGNOSIS — I10 ESSENTIAL HYPERTENSION, BENIGN: ICD-10-CM

## 2022-02-04 DIAGNOSIS — R06.02 EXERTIONAL SHORTNESS OF BREATH: Primary | ICD-10-CM

## 2022-02-04 PROCEDURE — 99204 OFFICE O/P NEW MOD 45 MIN: CPT | Performed by: INTERNAL MEDICINE

## 2022-02-04 RX ORDER — LISINOPRIL 10 MG/1
10 TABLET ORAL DAILY
Qty: 90 TABLET | Refills: 3 | Status: SHIPPED | OUTPATIENT
Start: 2022-02-04 | End: 2022-02-04

## 2022-02-04 RX ORDER — LISINOPRIL 20 MG/1
20 TABLET ORAL DAILY
Qty: 90 TABLET | Refills: 3 | Status: SHIPPED | OUTPATIENT
Start: 2022-02-04

## 2022-02-04 RX ORDER — METOPROLOL SUCCINATE 50 MG/1
50 TABLET, EXTENDED RELEASE ORAL DAILY
Qty: 90 TABLET | Refills: 3 | Status: SHIPPED | OUTPATIENT
Start: 2022-02-04

## 2022-02-04 RX ORDER — METOPROLOL SUCCINATE 25 MG/1
25 TABLET, EXTENDED RELEASE ORAL DAILY
Qty: 90 TABLET | Refills: 3 | Status: SHIPPED | OUTPATIENT
Start: 2022-02-04 | End: 2022-02-04

## 2022-02-04 ASSESSMENT — MIFFLIN-ST. JEOR: SCORE: 1600.98

## 2022-02-04 NOTE — TELEPHONE ENCOUNTER
Call from  that new order placed by MAT, unable to schedule stress test. New order placed, as per MAT original order. REBECCA,RN

## 2022-02-04 NOTE — PROGRESS NOTES
HEART CARE ENCOUNTER CONSULTATON NOTE      [facility]  [phone]      Assessment/Recommendations   Assessment:    1.  Ascending thoracic Aneurysm (47 mm by CT chest).  Aortic valve is document is trileaflet by echo.  2.  Essential hypertension, need very tight controlled.  Goal <120/60, ideally  3.  New onset, exertional dyspnea concerning for cardiac source.  Patient was able to play soccer in the spring 2021 with no symptoms.  In the fall he started noticing dyspnea with exertion also ambulating stairs.  Dyspnea resolved with rest.  Concerning for anginal equivalent.     Plan:  1.  Increase metoprolol XL 50 mg daily (can be held for stress testing)   2.  Increase lisinopril 20 mg daily, titrate as tolerated  3.  Continue amlodipine 10 mg daily.   4.  Repeat CTA or Cardiac MRA chest in 12 months, to confirm stable.  Would favor cardiac MRA as patient will require minimum yearly CT scans which resulted in significant radiation exposure and increase cancer risk.   4. Treadmill ECG stress testing for exertional dyspnea.  Patient has normal baseline ECG in 2019.  Can exercise  6. Complete echo for exertional dyspnea.     Today's clinic visit entailed:  Review of prior external note(s) from - Outside records from [facility]  Review of the result(s) of each unique test - CT scan, MRA  The following tests were independently interpreted by me as noted in my documentation: ECG  Ordering of each unique test  Prescription drug management  The level of medical decision making during this visit was of moderate complexity.     was used throughout encounter     History of Present Illness/Subjective    HPI: Marcelle Posadas is a 61 year old male history of ascending thoracic aortic enlargement who presents to cardiology to reestablish care with our team to manage ascending aortic aneurysm and hypertension along with new onset of dyspnea on exertion.    Patient has not been seen since 2018.  He was lost to  "follow-up and did not follow-up with us at that time.  At that time he had a significant ascending aortic aneurysm along with hypertension that was borderline controlled.  He is currently on appropriate antihypertensive medications including metoprolol, lisinopril and amlodipine.  We will increase his medications today as we do see ongoing increase in his aneurysm of his mid a sending aorta.  More concerning is his recent development of dyspnea on exertion.  He states in the spring 2021 he is able to play soccer with no difficulty.  He knows this fall as he was playing soccer he became quite short of breath after just a few minutes trying to stop.  He has not played soccer since the fall but now notes significant dyspnea with ambulating stairs last for about 1 minute into rest.  On discussion with the patient regarding need for adequate blood pressure control due to his aneurysm.  Also discussed with him stress testing.  He can exercise on a treadmill and his prior EKG demonstrated normal baseline.    Twelve-lead EKG from 1/29/2019.  Demonstrated normal sinus rhythm.  No significant ST or T abnormalities on resting baseline ECG    CT chest.  Outside records of CT chest report were reviewed.  The ascending aorta was 48 mm.  No significant coronary calcifications.    Cardiac MRI, 2019  IMPRESSIONS:  1. Normal left ventricular size, wall thickness and systolic function. The quantified left ventricular ejection fraction is  67.4%. No myocardial scar is identified.  2. Normal right ventricular size and systolic function.  3. No significant valvular abnormalities.  4. Moderate-sized ascending aorta aneurysm measuring a maximal diameter of 47 mm.    Patient is reestablishing care at the request of Dr. Briseno     Physical Examination  Review of Systems   Vitals: /82 (BP Location: Left arm, Patient Position: Sitting, Cuff Size: Adult Regular)   Pulse 62   Resp 16   Ht 1.676 m (5' 6\")   Wt 84.8 kg (187 lb)   BMI " 30.18 kg/m    BMI= Body mass index is 30.18 kg/m .  Wt Readings from Last 3 Encounters:   02/04/22 84.8 kg (187 lb)   05/10/19 86.7 kg (191 lb 1.6 oz)   01/11/19 84.6 kg (186 lb 9.6 oz)       General Appearance:   no distress, normal body habitus   ENT/Mouth: membranes moist, no oral lesions or bleeding gums.      EYES:  no scleral icterus, normal conjunctivae   Neck: no carotid bruits or thyromegaly   Chest/Lungs:   lungs are clear to auscultation, no rales or wheezing, no sternal scar, equal chest wall expansion    Cardiovascular:   Regular. Normal first and second heart sounds with no murmurs, rubs, or gallops; the carotid, radial and posterior tibial pulses are intact, Jugular venous pressure normal, no pitting edema bilaterally    Abdomen:  no organomegaly, masses, bruits, or tenderness; bowel sounds are present   Extremities: no cyanosis or clubbing   Skin: no xanthelasma, warm.    Neurologic: normal  bilateral, no tremors     Psychiatric: alert and oriented x3, calm        Please refer above for cardiac ROS details.        Medical History  Surgical History Family History Social History   Ascending aortic aneurysm with  no prior cardiac surgeries Family History   Problem Relation Age of Onset     Cancer Father         Social History     Socioeconomic History     Marital status:      Spouse name: Not on file     Number of children: Not on file     Years of education: Not on file     Highest education level: Not on file   Occupational History     Not on file   Tobacco Use     Smoking status: Never Smoker     Smokeless tobacco: Never Used   Substance and Sexual Activity     Alcohol use: No     Drug use: No     Sexual activity: Never   Other Topics Concern     Parent/sibling w/ CABG, MI or angioplasty before 65F 55M? No   Social History Narrative     Not on file     Social Determinants of Health     Financial Resource Strain: Not on file   Food Insecurity: Not on file   Transportation Needs: Not on file    Physical Activity: Not on file   Stress: Not on file   Social Connections: Not on file   Intimate Partner Violence: Not on file   Housing Stability: Not on file           Medications  Allergies   Current Outpatient Medications   Medication Sig Dispense Refill     amLODIPine (NORVASC) 10 MG tablet [AMLODIPINE (NORVASC) 10 MG TABLET] Take 1 tablet (10 mg total) by mouth daily. 90 tablet 3     blood pressure monitor Kit [BLOOD PRESSURE MONITOR KIT] Check blood pressure 3 times a day 1 each 0     lisinopril (PRINIVIL,ZESTRIL) 10 MG tablet [LISINOPRIL (PRINIVIL,ZESTRIL) 10 MG TABLET] Take 1 tablet (10 mg total) by mouth daily. 90 tablet 3     metoprolol succinate (TOPROL XL) 25 MG [METOPROLOL SUCCINATE (TOPROL XL) 25 MG] Take 1 tablet (25 mg total) by mouth daily. 90 tablet 3     No Known Allergies       Lab Results    Chemistry/lipid CBC Cardiac Enzymes/BNP/TSH/INR   Recent Labs   Lab Test 09/03/21  1141   CHOL 195   HDL 46   *   TRIG 66     Recent Labs   Lab Test 09/03/21  1141 02/26/21  0931   * 135*     Recent Labs   Lab Test 01/14/22  1455      POTASSIUM 3.5   CHLORIDE 107   CO2 23      BUN 15   CR 1.22   GFRESTIMATED 68   ZAHIRA 9.1     Recent Labs   Lab Test 01/14/22  1455 09/03/21  1141 02/26/21  0931   CR 1.22 1.38* 1.09     No results for input(s): A1C in the last 20270 hours.       Recent Labs   Lab Test 01/25/19  0900   WBC 8.7   HGB 14.4   HCT 43.7   MCV 95     Recent Labs   Lab Test 01/25/19  0900   HGB 14.4    No results for input(s): TROPONINI in the last 50383 hours.  No results for input(s): BNP, NTBNPI, NTBNP in the last 80727 hours.  No results for input(s): TSH in the last 43189 hours.  No results for input(s): INR in the last 23893 hours.     Yoan Head, DO

## 2022-02-04 NOTE — LETTER
2/4/2022    Sharda Briseno MD  [facility] [address] [place] [address]    RE: Marcelle Posadas       Dear Colleague,     I had the pleasure of seeing Marcelle Posadas in the [facility] [place] [facility].    HEART CARE ENCOUNTER CONSULTATON NOTE      [facility]  [phone]      Assessment/Recommendations   Assessment:    1.  Ascending thoracic Aneurysm (47 mm by CT chest).  Aortic valve is document is trileaflet by echo.  2.  Essential hypertension, need very tight controlled.  Goal <120/60, ideally  3.  New onset, exertional dyspnea concerning for cardiac source.  Patient was able to play soccer in the spring 2021 with no symptoms.  In the fall he started noticing dyspnea with exertion also ambulating stairs.  Dyspnea resolved with rest.  Concerning for anginal equivalent.     Plan:  1.  Increase metoprolol XL 50 mg daily (can be held for stress testing)   2.  Increase lisinopril 20 mg daily, titrate as tolerated  3.  Continue amlodipine 10 mg daily.   4.  Repeat CTA or Cardiac MRA chest in 12 months, to confirm stable.  Would favor cardiac MRA as patient will require minimum yearly CT scans which resulted in significant radiation exposure and increase cancer risk.   4. Treadmill ECG stress testing for exertional dyspnea.  Patient has normal baseline ECG in 2019.  Can exercise  6. Complete echo for exertional dyspnea.     Today's clinic visit entailed:  Review of prior external note(s) from - Outside records from [facility]  Review of the result(s) of each unique test - CT scan, MRA  The following tests were independently interpreted by me as noted in my documentation: ECG  Ordering of each unique test  Prescription drug management  The level of medical decision making during this visit was of moderate complexity.     was used throughout encounter     History of Present Illness/Subjective    HPI: Marcelle Posadas is a 61 year old male history of ascending thoracic aortic enlargement  who presents to cardiology to reestablish care with our team to manage ascending aortic aneurysm and hypertension along with new onset of dyspnea on exertion.    Patient has not been seen since 2018.  He was lost to follow-up and did not follow-up with us at that time.  At that time he had a significant ascending aortic aneurysm along with hypertension that was borderline controlled.  He is currently on appropriate antihypertensive medications including metoprolol, lisinopril and amlodipine.  We will increase his medications today as we do see ongoing increase in his aneurysm of his mid a sending aorta.  More concerning is his recent development of dyspnea on exertion.  He states in the spring 2021 he is able to play soccer with no difficulty.  He knows this fall as he was playing soccer he became quite short of breath after just a few minutes trying to stop.  He has not played soccer since the fall but now notes significant dyspnea with ambulating stairs last for about 1 minute into rest.  On discussion with the patient regarding need for adequate blood pressure control due to his aneurysm.  Also discussed with him stress testing.  He can exercise on a treadmill and his prior EKG demonstrated normal baseline.    Twelve-lead EKG from 1/29/2019.  Demonstrated normal sinus rhythm.  No significant ST or T abnormalities on resting baseline ECG    CT chest.  Outside records of CT chest report were reviewed.  The ascending aorta was 48 mm.  No significant coronary calcifications.    Cardiac MRI, 2019  IMPRESSIONS:  1. Normal left ventricular size, wall thickness and systolic function. The quantified left ventricular ejection fraction is  67.4%. No myocardial scar is identified.  2. Normal right ventricular size and systolic function.  3. No significant valvular abnormalities.  4. Moderate-sized ascending aorta aneurysm measuring a maximal diameter of 47 mm.    Patient is reestablishing care at the request of Dr. Briseno  "    Physical Examination  Review of Systems   Vitals: /82 (BP Location: Left arm, Patient Position: Sitting, Cuff Size: Adult Regular)   Pulse 62   Resp 16   Ht 1.676 m (5' 6\")   Wt 84.8 kg (187 lb)   BMI 30.18 kg/m    BMI= Body mass index is 30.18 kg/m .  Wt Readings from Last 3 Encounters:   02/04/22 84.8 kg (187 lb)   05/10/19 86.7 kg (191 lb 1.6 oz)   01/11/19 84.6 kg (186 lb 9.6 oz)       General Appearance:   no distress, normal body habitus   ENT/Mouth: membranes moist, no oral lesions or bleeding gums.      EYES:  no scleral icterus, normal conjunctivae   Neck: no carotid bruits or thyromegaly   Chest/Lungs:   lungs are clear to auscultation, no rales or wheezing, no sternal scar, equal chest wall expansion    Cardiovascular:   Regular. Normal first and second heart sounds with no murmurs, rubs, or gallops; the carotid, radial and posterior tibial pulses are intact, Jugular venous pressure normal, no pitting edema bilaterally    Abdomen:  no organomegaly, masses, bruits, or tenderness; bowel sounds are present   Extremities: no cyanosis or clubbing   Skin: no xanthelasma, warm.    Neurologic: normal  bilateral, no tremors     Psychiatric: alert and oriented x3, calm        Please refer above for cardiac ROS details.        Medical History  Surgical History Family History Social History   Ascending aortic aneurysm with  no prior cardiac surgeries Family History   Problem Relation Age of Onset     Cancer Father         Social History     Socioeconomic History     Marital status:      Spouse name: Not on file     Number of children: Not on file     Years of education: Not on file     Highest education level: Not on file   Occupational History     Not on file   Tobacco Use     Smoking status: Never Smoker     Smokeless tobacco: Never Used   Substance and Sexual Activity     Alcohol use: No     Drug use: No     Sexual activity: Never   Other Topics Concern     Parent/sibling w/ CABG, MI or " angioplasty before 65F 55M? No   Social History Narrative     Not on file     Social Determinants of Health     Financial Resource Strain: Not on file   Food Insecurity: Not on file   Transportation Needs: Not on file   Physical Activity: Not on file   Stress: Not on file   Social Connections: Not on file   Intimate Partner Violence: Not on file   Housing Stability: Not on file           Medications  Allergies   Current Outpatient Medications   Medication Sig Dispense Refill     amLODIPine (NORVASC) 10 MG tablet [AMLODIPINE (NORVASC) 10 MG TABLET] Take 1 tablet (10 mg total) by mouth daily. 90 tablet 3     blood pressure monitor Kit [BLOOD PRESSURE MONITOR KIT] Check blood pressure 3 times a day 1 each 0     lisinopril (PRINIVIL,ZESTRIL) 10 MG tablet [LISINOPRIL (PRINIVIL,ZESTRIL) 10 MG TABLET] Take 1 tablet (10 mg total) by mouth daily. 90 tablet 3     metoprolol succinate (TOPROL XL) 25 MG [METOPROLOL SUCCINATE (TOPROL XL) 25 MG] Take 1 tablet (25 mg total) by mouth daily. 90 tablet 3     No Known Allergies       Lab Results    Chemistry/lipid CBC Cardiac Enzymes/BNP/TSH/INR   Recent Labs   Lab Test 09/03/21  1141   CHOL 195   HDL 46   *   TRIG 66     Recent Labs   Lab Test 09/03/21  1141 02/26/21  0931   * 135*     Recent Labs   Lab Test 01/14/22  1455      POTASSIUM 3.5   CHLORIDE 107   CO2 23      BUN 15   CR 1.22   GFRESTIMATED 68   ZAHIRA 9.1     Recent Labs   Lab Test 01/14/22  1455 09/03/21  1141 02/26/21  0931   CR 1.22 1.38* 1.09     No results for input(s): A1C in the last 83787 hours.   Recent Labs   Lab Test 01/25/19  0900   WBC 8.7   HGB 14.4   HCT 43.7   MCV 95     Recent Labs   Lab Test 01/25/19  0900   HGB 14.4    No results for input(s): TROPONINI in the last 08111 hours.  No results for input(s): BNP, NTBNPI, NTBNP in the last 84149 hours.  No results for input(s): TSH in the last 65982 hours.  No results for input(s): INR in the last 68498 hours.         Yoan FLORENCE  DO VALERIE Head [facility]      cc:   Sharda Briseno MD  [facility] [address] [place] [address]

## 2022-02-25 ENCOUNTER — HOSPITAL ENCOUNTER (OUTPATIENT)
Dept: CARDIOLOGY | Facility: HOSPITAL | Age: 62
End: 2022-02-25
Attending: INTERNAL MEDICINE
Payer: COMMERCIAL

## 2022-02-25 DIAGNOSIS — I10 BENIGN ESSENTIAL HYPERTENSION: ICD-10-CM

## 2022-02-25 DIAGNOSIS — R06.02 EXERTIONAL SHORTNESS OF BREATH: ICD-10-CM

## 2022-02-25 DIAGNOSIS — I71.21 ASCENDING AORTIC ANEURYSM (H): ICD-10-CM

## 2022-02-25 LAB
CV STRESS CURRENT BP HE: NORMAL
CV STRESS CURRENT HR HE: 100
CV STRESS CURRENT HR HE: 101
CV STRESS CURRENT HR HE: 102
CV STRESS CURRENT HR HE: 102
CV STRESS CURRENT HR HE: 103
CV STRESS CURRENT HR HE: 107
CV STRESS CURRENT HR HE: 109
CV STRESS CURRENT HR HE: 109
CV STRESS CURRENT HR HE: 110
CV STRESS CURRENT HR HE: 111
CV STRESS CURRENT HR HE: 114
CV STRESS CURRENT HR HE: 118
CV STRESS CURRENT HR HE: 123
CV STRESS CURRENT HR HE: 123
CV STRESS CURRENT HR HE: 125
CV STRESS CURRENT HR HE: 130
CV STRESS CURRENT HR HE: 133
CV STRESS CURRENT HR HE: 135
CV STRESS CURRENT HR HE: 137
CV STRESS CURRENT HR HE: 139
CV STRESS CURRENT HR HE: 82
CV STRESS CURRENT HR HE: 96
CV STRESS CURRENT HR HE: 97
CV STRESS CURRENT HR HE: 99
CV STRESS CURRENT HR HE: 99
CV STRESS DEVIATION TIME HE: NORMAL
CV STRESS ECHO PERCENT HR HE: NORMAL
CV STRESS EXERCISE STAGE HE: NORMAL
CV STRESS EXERCISE STAGE REACHED HE: NORMAL
CV STRESS FINAL RESTING BP HE: NORMAL
CV STRESS FINAL RESTING HR HE: 99
CV STRESS MAX HR HE: 139
CV STRESS MAX TREADMILL GRADE HE: 14
CV STRESS MAX TREADMILL SPEED HE: 3.4
CV STRESS PEAK DIA BP HE: NORMAL
CV STRESS PEAK SYS BP HE: NORMAL
CV STRESS PHASE HE: NORMAL
CV STRESS PROTOCOL HE: NORMAL
CV STRESS REASON STOPPED HE: NORMAL
CV STRESS RESTING PT POSITION HE: NORMAL
CV STRESS RESTING PT POSITION HE: NORMAL
CV STRESS ST DEVIATION AMOUNT HE: NORMAL
CV STRESS ST DEVIATION ELEVATION HE: NORMAL
CV STRESS ST EVELATION AMOUNT HE: NORMAL
CV STRESS SYMPTOMS HE: NORMAL
CV STRESS TEST TYPE HE: NORMAL
CV STRESS TOTAL STAGE TIME MIN 1 HE: NORMAL
LVEF ECHO: NORMAL
STRESS ECHO BASELINE DIASTOLIC HE: 83
STRESS ECHO BASELINE HR: 82
STRESS ECHO BASELINE SYSTOLIC BP: 155
STRESS ECHO LAST STRESS DIASTOLIC BP: 92
STRESS ECHO LAST STRESS HR: 137
STRESS ECHO LAST STRESS SYSTOLIC BP: 154
STRESS ECHO POST ESTIMATED WORKLOAD: 7.7
STRESS ECHO POST EXERCISE DUR MIN: 6
STRESS ECHO POST EXERCISE DUR SEC: 23
STRESS ECHO TARGET HR: 136

## 2022-02-25 PROCEDURE — 93018 CV STRESS TEST I&R ONLY: CPT | Performed by: INTERNAL MEDICINE

## 2022-02-25 PROCEDURE — 93306 TTE W/DOPPLER COMPLETE: CPT

## 2022-02-25 PROCEDURE — 93306 TTE W/DOPPLER COMPLETE: CPT | Mod: 26 | Performed by: INTERNAL MEDICINE

## 2022-02-25 PROCEDURE — 93016 CV STRESS TEST SUPVJ ONLY: CPT | Performed by: INTERNAL MEDICINE

## 2022-02-25 PROCEDURE — 93017 CV STRESS TEST TRACING ONLY: CPT

## 2022-02-28 ENCOUNTER — TELEPHONE (OUTPATIENT)
Dept: CARDIOLOGY | Facility: CLINIC | Age: 62
End: 2022-02-28
Payer: COMMERCIAL

## 2022-02-28 DIAGNOSIS — R94.39 ABNORMAL CARDIOVASCULAR STRESS TEST: ICD-10-CM

## 2022-02-28 DIAGNOSIS — R06.02 EXERTIONAL SHORTNESS OF BREATH: Primary | ICD-10-CM

## 2022-02-28 NOTE — TELEPHONE ENCOUNTER
----- Message from Yoan Head DO sent at 2/25/2022  3:02 PM CST -----  Patient has abnormal ECG stress testing with low function status.  Recommend coronary angiogram with possible PCI.  I was concerned of obstructive CAD when I saw patient in clinic.      ThanksBogdan

## 2022-03-04 NOTE — TELEPHONE ENCOUNTER
[place] [facility] [place]. Discussion with patient of results and recommendations. Discussion of angiogram procedure, need for H&P and covid testing prior to. Opportunity to ask questions and have them answered. Patient would like to discuss with his children,and then make a decision. Discussed with patient that if he does decline the procedure would then want to talk with a physician to understand the risk he is taking. Pt verbalized understanding. Encouraged patient to discuss and make decision sooner than later. Informed patient writer will be out of the office next week, and as to not delay the procedure, given  direct number to call and give decision [phone]. Pt verbalized understanding, no further questions at this time. Will discuss with his children over the weekend, and call next week. Staff message sent to  to inform. Placed case request to be able to document any calls and procedural decision. Cmm,Rn

## 2022-03-11 ENCOUNTER — TRANSFERRED RECORDS (OUTPATIENT)
Dept: HEALTH INFORMATION MANAGEMENT | Facility: CLINIC | Age: 62
End: 2022-03-11
Payer: COMMERCIAL

## 2022-03-11 ENCOUNTER — LAB REQUISITION (OUTPATIENT)
Dept: LAB | Facility: CLINIC | Age: 62
End: 2022-03-11

## 2022-03-11 ENCOUNTER — APPOINTMENT (OUTPATIENT)
Dept: INTERPRETER SERVICES | Facility: CLINIC | Age: 62
End: 2022-03-11
Payer: COMMERCIAL

## 2022-03-11 DIAGNOSIS — Z01.818 ENCOUNTER FOR OTHER PREPROCEDURAL EXAMINATION: ICD-10-CM

## 2022-03-11 LAB
ANION GAP SERPL CALCULATED.3IONS-SCNC: 10 MMOL/L (ref 5–18)
BUN SERPL-MCNC: 24 MG/DL (ref 8–22)
CALCIUM SERPL-MCNC: 9.1 MG/DL (ref 8.5–10.5)
CHLORIDE BLD-SCNC: 109 MMOL/L (ref 98–107)
CO2 SERPL-SCNC: 24 MMOL/L (ref 22–31)
CREAT SERPL-MCNC: 1.41 MG/DL (ref 0.7–1.3)
ERYTHROCYTE [DISTWIDTH] IN BLOOD BY AUTOMATED COUNT: 13 % (ref 10–15)
GFR SERPL CREATININE-BSD FRML MDRD: 57 ML/MIN/1.73M2
GLUCOSE BLD-MCNC: 103 MG/DL (ref 70–125)
HCT VFR BLD AUTO: 40.6 % (ref 40–53)
HGB BLD-MCNC: 13.8 G/DL (ref 13.3–17.7)
MCH RBC QN AUTO: 33.1 PG (ref 26.5–33)
MCHC RBC AUTO-ENTMCNC: 34 G/DL (ref 31.5–36.5)
MCV RBC AUTO: 97 FL (ref 78–100)
PLATELET # BLD AUTO: 203 10E3/UL (ref 150–450)
POTASSIUM BLD-SCNC: 4.2 MMOL/L (ref 3.5–5)
RBC # BLD AUTO: 4.17 10E6/UL (ref 4.4–5.9)
SODIUM SERPL-SCNC: 143 MMOL/L (ref 136–145)
WBC # BLD AUTO: 6.9 10E3/UL (ref 4–11)

## 2022-03-11 PROCEDURE — 80048 BASIC METABOLIC PNL TOTAL CA: CPT | Performed by: NURSE PRACTITIONER

## 2022-03-11 PROCEDURE — 85018 HEMOGLOBIN: CPT | Performed by: NURSE PRACTITIONER

## 2022-03-21 DIAGNOSIS — Z11.59 ENCOUNTER FOR SCREENING FOR OTHER VIRAL DISEASES: Primary | ICD-10-CM

## 2022-03-21 NOTE — TELEPHONE ENCOUNTER
Yan Posadas Christian, RN  [facility] POSS PCI WITH PTK/CJP     7AM ADMIT ON 4/15     H&P: PMD - PT STATES HE ALREADY COMPLETED ONE THROUGH HIS PMD ON 3/18/22     NO ALERTS     COVID TEST - PT WILL SCHEDULE THROUGH PMD ON 4/11 OR 4/12      SERVICES NOTIFIED ON 3/21     THANKS!   -YAN

## 2022-04-11 ENCOUNTER — LAB REQUISITION (OUTPATIENT)
Dept: LAB | Facility: CLINIC | Age: 62
End: 2022-04-11
Payer: COMMERCIAL

## 2022-04-11 DIAGNOSIS — Z01.818 ENCOUNTER FOR OTHER PREPROCEDURAL EXAMINATION: ICD-10-CM

## 2022-04-11 PROCEDURE — U0005 INFEC AGEN DETEC AMPLI PROBE: HCPCS | Mod: ORL | Performed by: NURSE PRACTITIONER

## 2022-04-12 LAB — SARS-COV-2 RNA RESP QL NAA+PROBE: NEGATIVE

## 2022-04-13 NOTE — TELEPHONE ENCOUNTER
Marcelle L [facility]  [address] [place] [address]  [phone] (home)     Reason: Abnormal cardiac stress test  Procedure cardiologist:  Dr. Chacon  PCP:  Sharda Briseno  H&P completed by:  Alfreda Banda, CNP @[facility], available in media.  Admit date 4/15/22  Arrival time:  0700  Anticoagulation: None.  CPAP: No  Previous PCI: None.  Bypass Grafts: No  Renal Issues: No  Diabetic?: No  Device?: No    Covid-19 Test Date: 4/11/22  Location: viewable in Epic Patient understands after they get their test, they are to self-isolate at home until their procedure. They will only be called back if there results are positive.  Ambulatory?: Independently     Angiogram Teaching    Reason for Visit:    Telephone call to discuss pre-procedure education in preparation for: Coronary Angiogram with Possible Percutaneous Coronary Intervention     Procedure Prep:  Primary Cardiologist note dated: 2/4/22  EKG results obtained, dated: Morning of procedure  Hemogram results obtained: Morning of procedure  Basic Metabolic Panel results obtained: Morning of procedure  Lipid Profile results obtained: Morning of procedure     Pre-procedure instructions  Patient instructed to be NPO after midnight.  Patient instructed to shower the evening before or the morning of the procedure.  Leave all valuables at home (jewlery, rings, watches, large amounts of money).  Patient understands there is one visitor allowed during patients stay. Visitor will need to wear a mask their entire stay and remain in the restricted area per guidelines.   Patient instructed to arrange for transportation home following procedure from a responsible family member of friend. No driving for at least 24 hours post-procedure.  Patient instructed to have a responsible adult with them for 24 hours post-procedure.  Post-procedure follow up process.  Conscious sedation discussed.  Check your temperature morning of procedure along with assessment for any covid symptoms.  If symptoms or temperature, greater than or equal to 100.0, call [facility] at [phone] before leaving for the hospital.      Pre-procedure medication instructions  Patient instructed on antiplatelet medication.  Continue medications as scheduled, with a small amount of water on the day of the procedure unless indicated.  Patient instructed to take 325 mg of Aspirin am of procedure: Yes  Other medication: Morning of procedure please HOLD all vitamins, minerals, and supplements. Please TAKE (4) baby aspirin or (1) full size 325 mg aspirin morning of procedure. Patient doesn't have Aspirin at home. Will be provided at hospital.       *PATIENTS RECORDS AVAILABLE IN [employer] UNLESS OTHERWISE INDICATED*      Patient Active Problem List   Diagnosis     Essential hypertension, benign     Thoracic aortic aneurysm without rupture (H)       Current Outpatient Medications   Medication Sig Dispense Refill     amLODIPine (NORVASC) 10 MG tablet [AMLODIPINE (NORVASC) 10 MG TABLET] Take 1 tablet (10 mg total) by mouth daily. 90 tablet 3     blood pressure monitor Kit [BLOOD PRESSURE MONITOR KIT] Check blood pressure 3 times a day 1 each 0     lisinopril (ZESTRIL) 20 MG tablet Take 1 tablet (20 mg) by mouth daily 90 tablet 3     metoprolol succinate ER (TOPROL-XL) 50 MG 24 hr tablet Take 1 tablet (50 mg) by mouth daily 90 tablet 3       No Known Allergies    Plan: Patient education completed. Opportunity to ask questions and have them answered. None further at this time. Patient verbalized understanding of responsible adult for 24 hours and  post-procedure at time of discharge. Pt will be accompanied by his wife. Pt ready for procedure.       Alex Sifuentes RN

## 2022-04-14 ENCOUNTER — PREP FOR PROCEDURE (OUTPATIENT)
Dept: CARDIOLOGY | Facility: CLINIC | Age: 62
End: 2022-04-14
Payer: COMMERCIAL

## 2022-04-14 DIAGNOSIS — R94.39 ABNORMAL CARDIOVASCULAR STRESS TEST: Primary | ICD-10-CM

## 2022-04-14 DIAGNOSIS — R06.02 EXERTIONAL SHORTNESS OF BREATH: ICD-10-CM

## 2022-04-14 RX ORDER — ASPIRIN 325 MG
325 TABLET ORAL ONCE
Status: CANCELLED | OUTPATIENT
Start: 2022-04-15 | End: 2022-04-14

## 2022-04-14 RX ORDER — DIAZEPAM 5 MG
5 TABLET ORAL
Status: CANCELLED | OUTPATIENT
Start: 2022-04-15

## 2022-04-14 RX ORDER — LIDOCAINE 40 MG/G
CREAM TOPICAL
Status: CANCELLED | OUTPATIENT
Start: 2022-04-14

## 2022-04-14 RX ORDER — ASPIRIN 81 MG/1
243 TABLET, CHEWABLE ORAL ONCE
Status: CANCELLED | OUTPATIENT
Start: 2022-04-15

## 2022-04-14 RX ORDER — FENTANYL CITRATE 50 UG/ML
25 INJECTION, SOLUTION INTRAMUSCULAR; INTRAVENOUS
Status: CANCELLED | OUTPATIENT
Start: 2022-04-15

## 2022-04-14 RX ORDER — SODIUM CHLORIDE 9 MG/ML
INJECTION, SOLUTION INTRAVENOUS CONTINUOUS
Status: CANCELLED | OUTPATIENT
Start: 2022-04-15

## 2022-04-14 NOTE — TELEPHONE ENCOUNTER
With the help of a ong , education completed. Pt doesn't have plain aspirin at home. Informed patient will be given at the hospital.     Called [facility], as the date of the H&P completed was communicated a different date, stated completion on 3/18/22. H&P received yesterday, and date shows 3/11/22. Called [facility] to inform and ask if CNP would be able to complete for procedure. REBECCARN

## 2022-04-15 ENCOUNTER — HOSPITAL ENCOUNTER (OUTPATIENT)
Facility: HOSPITAL | Age: 62
Discharge: HOME | End: 2022-04-15
Attending: INTERNAL MEDICINE | Admitting: INTERNAL MEDICINE
Payer: COMMERCIAL

## 2022-04-15 VITALS
OXYGEN SATURATION: 97 % | WEIGHT: 185.5 LBS | HEART RATE: 61 BPM | HEIGHT: 66 IN | DIASTOLIC BLOOD PRESSURE: 86 MMHG | RESPIRATION RATE: 22 BRPM | SYSTOLIC BLOOD PRESSURE: 118 MMHG | BODY MASS INDEX: 29.81 KG/M2 | TEMPERATURE: 97.9 F

## 2022-04-15 DIAGNOSIS — R94.39 ABNORMAL CARDIOVASCULAR STRESS TEST: ICD-10-CM

## 2022-04-15 DIAGNOSIS — R06.02 EXERTIONAL SHORTNESS OF BREATH: ICD-10-CM

## 2022-04-15 PROBLEM — G47.33 OBSTRUCTIVE SLEEP APNEA: Status: ACTIVE | Noted: 2022-04-15

## 2022-04-15 PROBLEM — B18.1 CHRONIC VIRAL HEPATITIS B (H): Status: ACTIVE | Noted: 2022-04-15

## 2022-04-15 PROBLEM — E66.9 OBESITY: Status: ACTIVE | Noted: 2022-04-15

## 2022-04-15 LAB
ABO/RH(D): NORMAL
ANION GAP SERPL CALCULATED.3IONS-SCNC: 9 MMOL/L (ref 5–18)
ANTIBODY SCREEN: NEGATIVE
ATRIAL RATE - MUSE: 56 BPM
BUN SERPL-MCNC: 16 MG/DL (ref 8–22)
CALCIUM SERPL-MCNC: 8.8 MG/DL (ref 8.5–10.5)
CHLORIDE BLD-SCNC: 109 MMOL/L (ref 98–107)
CHOLEST SERPL-MCNC: 236 MG/DL
CO2 SERPL-SCNC: 23 MMOL/L (ref 22–31)
CREAT SERPL-MCNC: 1.05 MG/DL (ref 0.7–1.3)
DIASTOLIC BLOOD PRESSURE - MUSE: NORMAL MMHG
ERYTHROCYTE [DISTWIDTH] IN BLOOD BY AUTOMATED COUNT: 12.8 % (ref 10–15)
FASTING STATUS PATIENT QL REPORTED: YES
GFR SERPL CREATININE-BSD FRML MDRD: 81 ML/MIN/1.73M2
GLUCOSE BLD-MCNC: 104 MG/DL (ref 70–125)
HCT VFR BLD AUTO: 42.3 % (ref 40–53)
HDLC SERPL-MCNC: 50 MG/DL
HGB BLD-MCNC: 14.2 G/DL (ref 13.3–17.7)
INTERPRETATION ECG - MUSE: NORMAL
LDLC SERPL CALC-MCNC: 162 MG/DL
MCH RBC QN AUTO: 32.3 PG (ref 26.5–33)
MCHC RBC AUTO-ENTMCNC: 33.6 G/DL (ref 31.5–36.5)
MCV RBC AUTO: 96 FL (ref 78–100)
P AXIS - MUSE: 46 DEGREES
PLATELET # BLD AUTO: 218 10E3/UL (ref 150–450)
POTASSIUM BLD-SCNC: 3.7 MMOL/L (ref 3.5–5)
PR INTERVAL - MUSE: 206 MS
QRS DURATION - MUSE: 118 MS
QT - MUSE: 452 MS
QTC - MUSE: 436 MS
R AXIS - MUSE: 17 DEGREES
RBC # BLD AUTO: 4.4 10E6/UL (ref 4.4–5.9)
SODIUM SERPL-SCNC: 141 MMOL/L (ref 136–145)
SPECIMEN EXPIRATION DATE: NORMAL
SYSTOLIC BLOOD PRESSURE - MUSE: NORMAL MMHG
T AXIS - MUSE: 85 DEGREES
TRIGL SERPL-MCNC: 122 MG/DL
VENTRICULAR RATE- MUSE: 56 BPM
WBC # BLD AUTO: 6.1 10E3/UL (ref 4–11)

## 2022-04-15 PROCEDURE — 93010 ELECTROCARDIOGRAM REPORT: CPT | Performed by: INTERNAL MEDICINE

## 2022-04-15 PROCEDURE — 999N000054 HC STATISTIC EKG NON-CHARGEABLE

## 2022-04-15 PROCEDURE — 93458 L HRT ARTERY/VENTRICLE ANGIO: CPT | Mod: 26 | Performed by: INTERNAL MEDICINE

## 2022-04-15 PROCEDURE — 272N000001 HC OR GENERAL SUPPLY STERILE: Performed by: INTERNAL MEDICINE

## 2022-04-15 PROCEDURE — 250N000011 HC RX IP 250 OP 636: Performed by: INTERNAL MEDICINE

## 2022-04-15 PROCEDURE — C1887 CATHETER, GUIDING: HCPCS | Performed by: INTERNAL MEDICINE

## 2022-04-15 PROCEDURE — 36415 COLL VENOUS BLD VENIPUNCTURE: CPT | Performed by: INTERNAL MEDICINE

## 2022-04-15 PROCEDURE — 999N000054 ECG 12-LEAD WITH MUSE (LHE): Performed by: INTERNAL MEDICINE

## 2022-04-15 PROCEDURE — 80061 LIPID PANEL: CPT | Performed by: INTERNAL MEDICINE

## 2022-04-15 PROCEDURE — 255N000002 HC RX 255 OP 636: Performed by: INTERNAL MEDICINE

## 2022-04-15 PROCEDURE — 85014 HEMATOCRIT: CPT | Performed by: INTERNAL MEDICINE

## 2022-04-15 PROCEDURE — 93005 ELECTROCARDIOGRAM TRACING: CPT

## 2022-04-15 PROCEDURE — 250N000013 HC RX MED GY IP 250 OP 250 PS 637: Performed by: INTERNAL MEDICINE

## 2022-04-15 PROCEDURE — 86901 BLOOD TYPING SEROLOGIC RH(D): CPT | Performed by: INTERNAL MEDICINE

## 2022-04-15 PROCEDURE — 99213 OFFICE O/P EST LOW 20 MIN: CPT | Performed by: NURSE PRACTITIONER

## 2022-04-15 PROCEDURE — 80048 BASIC METABOLIC PNL TOTAL CA: CPT | Performed by: INTERNAL MEDICINE

## 2022-04-15 PROCEDURE — 93458 L HRT ARTERY/VENTRICLE ANGIO: CPT | Performed by: INTERNAL MEDICINE

## 2022-04-15 PROCEDURE — 258N000003 HC RX IP 258 OP 636: Performed by: INTERNAL MEDICINE

## 2022-04-15 PROCEDURE — C1894 INTRO/SHEATH, NON-LASER: HCPCS | Performed by: INTERNAL MEDICINE

## 2022-04-15 PROCEDURE — 250N000009 HC RX 250: Performed by: INTERNAL MEDICINE

## 2022-04-15 RX ORDER — DIAZEPAM 5 MG
5 TABLET ORAL
Status: COMPLETED | OUTPATIENT
Start: 2022-04-15 | End: 2022-04-15

## 2022-04-15 RX ORDER — ATROPINE SULFATE 0.1 MG/ML
0.5 INJECTION INTRAVENOUS
Status: DISCONTINUED | OUTPATIENT
Start: 2022-04-15 | End: 2022-04-15 | Stop reason: HOSPADM

## 2022-04-15 RX ORDER — ACETAMINOPHEN 325 MG/1
650 TABLET ORAL EVERY 4 HOURS PRN
Status: DISCONTINUED | OUTPATIENT
Start: 2022-04-15 | End: 2022-04-15 | Stop reason: HOSPADM

## 2022-04-15 RX ORDER — ASPIRIN 81 MG/1
243 TABLET, CHEWABLE ORAL ONCE
Status: COMPLETED | OUTPATIENT
Start: 2022-04-15 | End: 2022-04-15

## 2022-04-15 RX ORDER — FLUMAZENIL 0.1 MG/ML
0.2 INJECTION, SOLUTION INTRAVENOUS
Status: DISCONTINUED | OUTPATIENT
Start: 2022-04-15 | End: 2022-04-15 | Stop reason: HOSPADM

## 2022-04-15 RX ORDER — OXYCODONE HYDROCHLORIDE 5 MG/1
5 TABLET ORAL EVERY 4 HOURS PRN
Status: DISCONTINUED | OUTPATIENT
Start: 2022-04-15 | End: 2022-04-15 | Stop reason: HOSPADM

## 2022-04-15 RX ORDER — NALOXONE HYDROCHLORIDE 0.4 MG/ML
0.2 INJECTION, SOLUTION INTRAMUSCULAR; INTRAVENOUS; SUBCUTANEOUS
Status: DISCONTINUED | OUTPATIENT
Start: 2022-04-15 | End: 2022-04-15 | Stop reason: HOSPADM

## 2022-04-15 RX ORDER — FENTANYL CITRATE 50 UG/ML
25 INJECTION, SOLUTION INTRAMUSCULAR; INTRAVENOUS
Status: DISCONTINUED | OUTPATIENT
Start: 2022-04-15 | End: 2022-04-15 | Stop reason: HOSPADM

## 2022-04-15 RX ORDER — LIDOCAINE 40 MG/G
CREAM TOPICAL
Status: DISCONTINUED | OUTPATIENT
Start: 2022-04-15 | End: 2022-04-15 | Stop reason: HOSPADM

## 2022-04-15 RX ORDER — NALOXONE HYDROCHLORIDE 0.4 MG/ML
0.4 INJECTION, SOLUTION INTRAMUSCULAR; INTRAVENOUS; SUBCUTANEOUS
Status: DISCONTINUED | OUTPATIENT
Start: 2022-04-15 | End: 2022-04-15 | Stop reason: HOSPADM

## 2022-04-15 RX ORDER — ASPIRIN 325 MG
325 TABLET ORAL ONCE
Status: COMPLETED | OUTPATIENT
Start: 2022-04-15 | End: 2022-04-15

## 2022-04-15 RX ORDER — FENTANYL CITRATE 50 UG/ML
INJECTION, SOLUTION INTRAMUSCULAR; INTRAVENOUS
Status: DISCONTINUED | OUTPATIENT
Start: 2022-04-15 | End: 2022-04-15 | Stop reason: HOSPADM

## 2022-04-15 RX ORDER — OXYCODONE HYDROCHLORIDE 5 MG/1
10 TABLET ORAL EVERY 4 HOURS PRN
Status: DISCONTINUED | OUTPATIENT
Start: 2022-04-15 | End: 2022-04-15 | Stop reason: HOSPADM

## 2022-04-15 RX ORDER — SODIUM CHLORIDE 9 MG/ML
INJECTION, SOLUTION INTRAVENOUS CONTINUOUS
Status: DISCONTINUED | OUTPATIENT
Start: 2022-04-15 | End: 2022-04-15 | Stop reason: HOSPADM

## 2022-04-15 RX ORDER — IODIXANOL 320 MG/ML
INJECTION, SOLUTION INTRAVASCULAR
Status: DISCONTINUED | OUTPATIENT
Start: 2022-04-15 | End: 2022-04-15 | Stop reason: HOSPADM

## 2022-04-15 RX ADMIN — ASPIRIN 325 MG: 325 TABLET ORAL at 08:47

## 2022-04-15 RX ADMIN — SODIUM CHLORIDE 100 ML/HR: 9 INJECTION, SOLUTION INTRAVENOUS at 08:37

## 2022-04-15 RX ADMIN — DIAZEPAM 5 MG: 5 TABLET ORAL at 08:47

## 2022-04-15 ASSESSMENT — EJECTION FRACTION: EF_VALUE: .23

## 2022-04-15 NOTE — H&P
[facility]  [phone]      Assessment/Recommendations   ASSESSMENT:    Recent onset, exertional dyspnea concerning for cardiac etiology    HTN- controlled    Ascending thoracic aneurysm- 4.7 x 4.8cm on CT pulmonary angiogram 9/2021,     INES- on CPAP    Chronic Hepatitis B- followed by GI    History of pulmonary nodule- per primary care physician was stable on 2014 CT with no need for follow-up    Overweight- BMI 29.94kg/m2    Gout      PLAN:    Coronary angiogram with possible percutaneous coronary intervention    Risks/benefits/expected outcomes were discussed with the patient who verbalizes understanding and wishes to proceed    Further recommendations pending angiographic results       History of Present Illness/Subjective    HPI: Marcelle Posadas is a 61 year old Laotian male with history of HTN, ascending thoracic aneurysm, INES; on CPAP, chronic hepatitis B, overweight/borderline Class I obesity, hx of pulmonary nodule and gout who presents for coronary angiogram with possible percutaneous coronary intervention.     The patient is Hmong speaking and his history is obtained with the assistance of a live , from the patient and partially from his medical record.     The patient has noticed symptoms of progressive dyspnea on exertion which began in the fall 2021.   His symptoms occur primarily when he is trying to ascend stairs or play soccer and tend to resolve with rest. He otherwise denies chest pain, palpitations, lightheadedness, dizziness, cough, orthopnea, PND, or significant LE edema.    Marcelle has a history of known ascending thoracic aneurysm which last measured 47mm on cardiac MRI in 2019. He was recommended to have yearly evaluation however he had declined due to reasons of cost and was lost to cardiology follow-up. He saw Dr. Head in February of this past year to re-establish care. Most recent evaluation of his aneurysm was performed in September 2021 by virtue of CT  pulmonary angiogram which demonstrated a fusiform dilation/enlargement of the thoracic aorta including the root without effacement of the sinotubular junction. Mid ascending aorta measures up to 4.7 x 4.8 cm. Probable thickening of the left ventricular myocardium is also noted.    Last echocardiogram was performed in 2022 with findings listed below.     ECHOCARDIOGRAM 2022  [id number]  [id number]  [id number]  [id number]^SUZETTE^BOBY[place] [address]     Name: RUBI CAI  MRN: [id number]  : 1960  Study Date: 2022 02:47 PM  Age: 61 yrs  Gender: Male  Patient Location: [facility]  Reason For Study: Exertional shortness of breath, Ascending aortic aneurysm  (H), Benign essential hypertension  Ordering Physician: BOBY BRADFORD  Referring Physician: BOBY BRADFORD  Performed By: TITUS     BSA: 1.9 m2  Height: 66 in  Weight: 187 lb  HR: 77  BP: 127/79 mmHg  ______________________________________________________________________________  Procedure  Complete Echo Adult.  ______________________________________________________________________________  Interpretation Summary     1. The left ventricle is normal in size. Left ventricular function is  normal.The ejection fraction is 60-65%. There is mild concentric left  ventricular hypertrophy. No regional wall motion abnormalities noted.  2. Normal right ventricle size and systolic function.  3. The left atrium is moderately dilated.  4. There is mild (1+) tricuspid regurgitation.  5. There is mild (1+) aortic regurgitation.  6. Right ventricle systolic pressure estimate normal  7. The ascending aorta is Moderately dilated at 47 mm. There is mild aortic  root enlargement at 42 mm.   ______________________________________________________________________________  Left Ventricle  Left ventricular function is normal.The ejection fraction is 60-65%. The left  ventricle is normal in size. There is mild  "concentric left ventricular  hypertrophy. Left ventricular diastolic function is abnormal. No regional wall  motion abnormalities noted.     Right Ventricle  Normal right ventricle size and systolic function. TAPSE is normal, which is  consistent with normal right ventricular systolic function.     Atria  The left atrium is moderately dilated. The right atrium is mildly dilated.  There is no color Doppler evidence of an atrial shunt.     Mitral Valve  Mitral valve leaflets appear normal. There is no evidence of mitral stenosis  or clinically significant mitral regurgitation. There is no mitral  regurgitation noted. There is no mitral valve stenosis.     Tricuspid Valve  The tricuspid valve is not well visualized, but is grossly normal. There is  mild (1+) tricuspid regurgitation. The right ventricular systolic pressure is  approximated at 20.8 mmHg plus the right atrial pressure. Right ventricle  systolic pressure estimate normal. IVC diameter <2.1 cm collapsing >50% with  sniff suggests a normal RA pressure of 3 mmHg. There is no tricuspid stenosis.     Aortic Valve  Aortic valve leaflets appear normal. There is no evidence of aortic stenosis  or clinically significant aortic regurgitation. The aortic valve is  trileaflet. There is mild (1+) aortic regurgitation. No aortic stenosis is  present.     Pulmonic Valve  The pulmonic valve is not well seen, but is grossly normal. This degree of  valvular regurgitation is within normal limits.     Vessels  The aorta root is normal. The ascending aorta is Moderately dilated. There is  mild aortic root enlargement. Normal size ascending aorta. IVC diameter <2.1  cm collapsing >50% with sniff suggests a normal RA pressure of 3 mmHg.     Pericardium  There is no pericardial effusion.       Physical Examination  Review of Systems   Vitals: /76 (BP Location: Left arm)   Pulse 58   Temp 98.8  F (37.1  C) (Oral)   Resp 17   Ht 1.676 m (5' 6\")   Wt 84.1 kg (185 lb 8 oz)  "  SpO2 96%   BMI 29.94 kg/m     BMI= Body mass index is 29.94 kg/m .  Wt Readings from Last 3 Encounters:   04/15/22 84.1 kg (185 lb 8 oz)   02/04/22 84.8 kg (187 lb)   05/10/19 86.7 kg (191 lb 1.6 oz)     General Appearance:   no distress, obese body habitus   ENT/Mouth: membranes moist, no oral lesions or bleeding gums.      EYES:  no scleral icterus, normal conjunctivae   Neck: no carotid bruits or thyromegaly   Chest/Lungs:   lungs are clear to auscultation, no rales or wheezing, equal chest wall expansion    Cardiovascular:   Regular. Normal first and second heart sounds with no murmurs, rubs, or gallops; the carotid, radial and posterior tibial pulses are intact, Jugular venous pressure not elevated, no significant edema   Abdomen:  no organomegaly, masses, bruits, or tenderness; bowel sounds are present   Extremities: no cyanosis or clubbing   Skin: no xanthelasma, warm.    Neurologic: normal  bilateral, no tremors     Psychiatric: alert and oriented x3, calm        Please refer above for cardiac ROS details.     Skin: negative for rash, itching, bruising  Eyes: negative for visual blurring, double vision  Ears/Nose/Throat: negative for epistaxis  Respiratory: See HPI  Cardiovascular: See HPI for pertinent positives. Negative for palpitations, irregular heart beat, paroxysmal nocturnal dyspnea, orthopnea, lower extremity edema and syncope or near-syncope  Gastrointestinal: negative for hematochezia, nausea, vomiting, abdominal pain or constipation  Genitourinary: negative for hematuria, urinary urgency, frequency, or problems urinating.   Musculoskeletal: joint pain, joint swelling and muscular weakness  Neurologic: negative for dizziness, lightheadedness, syncope or near syncope  Hematologic/Lymphatic/Immunologic: negative for anemia and bleeding disorder  Endocrine: negative for diabetes         Medical History  Surgical History Family History Social History     Past Medical History:   Diagnosis Date      Class 1 obesity with serious comorbidity and body mass index (BMI) of 30.0 to 30.9 in adult      Hepatitis B infection      HLD (hyperlipidemia)      HTN (hypertension)      INES (obstructive sleep apnea)      Pulmonary nodules      Skull fracture (H)     2017      No significant surgical history  Family History   Problem Relation Age of Onset     Cancer Father         Social History     Socioeconomic History     Marital status:      Spouse name: Not on file     Number of children: Not on file     Years of education: Not on file     Highest education level: Not on file   Occupational History     Not on file   Tobacco Use     Smoking status: Never Smoker     Smokeless tobacco: Never Used   Substance and Sexual Activity     Alcohol use: No     Drug use: No     Sexual activity: Never   Other Topics Concern     Parent/sibling w/ CABG, MI or angioplasty before 65F 55M? No   Social History Narrative     Not on file     Social Determinants of Health     Financial Resource Strain: Not on file   Food Insecurity: Not on file   Transportation Needs: Not on file   Physical Activity: Not on file   Stress: Not on file   Social Connections: Not on file   Intimate Partner Violence: Not on file   Housing Stability: Not on file           Medications  Allergies   Current Outpatient Medications   Medication Sig Dispense Refill     amLODIPine (NORVASC) 10 MG tablet [AMLODIPINE (NORVASC) 10 MG TABLET] Take 1 tablet (10 mg total) by mouth daily. 90 tablet 3     blood pressure monitor Kit [BLOOD PRESSURE MONITOR KIT] Check blood pressure 3 times a day 1 each 0     lisinopril (ZESTRIL) 20 MG tablet Take 1 tablet (20 mg) by mouth daily 90 tablet 3     metoprolol succinate ER (TOPROL-XL) 50 MG 24 hr tablet Take 1 tablet (50 mg) by mouth daily 90 tablet 3     No Known Allergies       Lab Results    Chemistry/lipid CBC Cardiac Enzymes/BNP/TSH/INR   Recent Labs   Lab Test 09/03/21  1141   CHOL 195   HDL 46   *   TRIG 66      Recent Labs   Lab Test 09/03/21  1141 02/26/21  0931   * 135*     Recent Labs   Lab Test 03/11/22  1358      POTASSIUM 4.2   CHLORIDE 109*   CO2 24      BUN 24*   CR 1.41*   GFRESTIMATED 57*   ZAHIRA 9.1     Recent Labs   Lab Test 03/11/22  1358 01/14/22  1455 09/03/21  1141   CR 1.41* 1.22 1.38*     No results for input(s): A1C in the last 91167 hours.       Recent Labs   Lab Test 03/11/22  1358   WBC 6.9   HGB 13.8   HCT 40.6   MCV 97        Recent Labs   Lab Test 03/11/22  1358 01/25/19  0900   HGB 13.8 14.4    No results for input(s): TROPONINI in the last 65612 hours.  No results for input(s): BNP, NTBNPI, NTBNP in the last 81652 hours.  No results for input(s): TSH in the last 25784 hours.  No results for input(s): INR in the last 94252 hours.     Camryn Baig, CNP

## 2022-04-15 NOTE — PLAN OF CARE
Problem: Plan of Care - These are the overarching goals to be used throughout the patient stay.    Goal: Absence of Hospital-Acquired Illness or Injury  Intervention: Prevent and Manage VTE (Venous Thromboembolism) Risk  Recent Flowsheet Documentation  Taken 4/15/2022 1400 by Vandana Lal RN  Activity Management:   activity encouraged   ambulated in [place]   ambulated to bathroom     Patient was kept comfortable during post-procedure stay.VSS. Denies pain. Right radial access site remains dry & free from signs of bleeding. Appointments made & included in AVS. Dr. Tinoco was able to speak with patient's wife post procedure. Post-op instructions given to patient & spouse via . Able to ask questions. Verbalized no concerns. Belongings returned. Discharged in stable condition, accompanied by his wife.

## 2022-04-15 NOTE — DISCHARGE INSTRUCTIONS
Interventional Cardiology  Coronary Angiogram/Angioplasty/Stent/Atherectomy Discharge  Instructions - Radial (wrist) Approach     The instructions below are to help you understand how to take care of yourself. There is also information about when to call the doctor or emergency services.    For 24 hours after procedure:  Do not subject hand/arm to any forceful movements for 24 hours, such as supporting weight when rising from a chair or bed.  Do not drive a car for 24 hours.  The dressing on the puncture site may be removed after 24 hours and left open to air. If minor oozing, you may apply a Band-Aid and remove after 12 hours.   You may shower on the day after your procedure. Do not take a tub bath or wash dishes (no soaking wrist) with the puncture site in water for 3 days after the procedure.    For 48 hours following the procedure:  Do not operate a lawnmower, motorcycle, chainsaw or all-terrain vehicle.  Do not lift anything heavier than 5-10 pounds with affected arm for 5 days.  Avoid excessive bending (flexion/extension) wrist movement.  Do not engage in vigorous exercise (i.e. tennis, golf) using the affected arm for 5 days after discharge.  You may return to work in 72 hours if no complications and no heavy lifting.    If bleeding should occur following discharge:  Sit down and apply firm pressure with your thumb against the puncture site and fingers against back of wrist for 10 minutes.  If the bleeding stops, continue to rest, keeping your wrist still for 2 hours. Notify your doctor as soon as possible.  If bleeding does not stop after 10 minutes or if there is a large amount of bleeding or spurting, call 911 immediately. Do not drive yourself to the hospital.           Contact the Heart Clinic at [phone] if you develop:  Fever over 100.4, that lasts more than one day  Redness, heat, or pus at the puncture site  Change in color or temperature of the hand or arm    Expect mild tingling of hand and  tenderness at the puncture site for up to 3 days. You may take Tylenol or a pain medicine recommended by your doctor.          Your Procedural Physician was: Dr. Alphonso Chacon  the phone number is: [phone]    [facility]:  [phone]  If you are calling after hours, please listen to the entire voicemail, a live  will answer at the end of the message

## 2022-04-15 NOTE — INTERVAL H&P NOTE
"I have reviewed the surgical (or preoperative) H&P that is linked to this encounter, and examined the patient. There are no significant changes    Clinical Conditions Present on Arrival:  Clinically Significant Risk Factors Present on Admission                   # Overweight: Estimated body mass index is 29.94 kg/m  as calculated from the following:    Height as of this encounter: 1.676 m (5' 6\").    Weight as of this encounter: 84.1 kg (185 lb 8 oz).       "

## 2022-04-15 NOTE — PROGRESS NOTES
Hmong speaking pt admitted with his wife Carmelina who speaks English well.  Aaliyah present. Pt states he has had increasing SOB which improves with rest, denies chest pain. Sinus Ajit, VSS. Checklist complete, Labs noted, ready for procedure.

## 2022-04-15 NOTE — Clinical Note
In Person used to assist with interpretion.Intepreter name Aaliyah Fontanez. phone number [phone].Agency from [employer]Language Hmong.

## 2022-04-18 NOTE — PRE-PROCEDURE
GENERAL PRE-PROCEDURE:   Procedure:  Coronary angiogram with possible PCI  Date/Time:  4/15/2022 6:57 AM    Written consent obtained?: Yes    Risks and benefits: Risks, benefits and alternatives were discussed    Consent given by:  Patient  Patient states understanding of procedure being performed: Yes    Patient's understanding of procedure matches consent: Yes    Procedure consent matches procedure scheduled: Yes    Expected level of sedation:  Moderate  Appropriately NPO:  Yes  ASA Class:  3 (abnormal stres test, HTN, ascending thoracic aneurysm, INES, Chronic hepatitis B, pulmonary nodule, overweight, gout)  Mallampati  :  Grade 2- soft palate, base of uvula, tonsillar pillars, and portion of posterior pharyngeal wall visible  Lungs:  Lungs clear with good breath sounds bilaterally  Heart:  Normal heart sounds and rate  History & Physical reviewed:  Abbreviated history and physical done prior to moderate sedation  Statement of review:  I have reviewed the lab findings, diagnostic data, medications, and the plan for sedation

## 2022-07-29 ENCOUNTER — LAB REQUISITION (OUTPATIENT)
Dept: LAB | Facility: CLINIC | Age: 62
End: 2022-07-29

## 2022-07-29 DIAGNOSIS — M10.072 IDIOPATHIC GOUT, LEFT ANKLE AND FOOT: ICD-10-CM

## 2022-07-29 LAB
ANION GAP SERPL CALCULATED.3IONS-SCNC: 13 MMOL/L (ref 7–15)
BUN SERPL-MCNC: 21.5 MG/DL (ref 8–23)
CALCIUM SERPL-MCNC: 9 MG/DL (ref 8.8–10.2)
CHLORIDE SERPL-SCNC: 110 MMOL/L (ref 98–107)
CREAT SERPL-MCNC: 1.18 MG/DL (ref 0.67–1.17)
DEPRECATED HCO3 PLAS-SCNC: 20 MMOL/L (ref 22–29)
GFR SERPL CREATININE-BSD FRML MDRD: 71 ML/MIN/1.73M2
GLUCOSE SERPL-MCNC: 109 MG/DL (ref 70–99)
POTASSIUM SERPL-SCNC: 3.6 MMOL/L (ref 3.4–5.3)
SODIUM SERPL-SCNC: 143 MMOL/L (ref 136–145)
URATE SERPL-MCNC: 8.8 MG/DL (ref 3.4–7)

## 2022-07-29 PROCEDURE — 80048 BASIC METABOLIC PNL TOTAL CA: CPT | Performed by: NURSE PRACTITIONER

## 2022-07-29 PROCEDURE — 84550 ASSAY OF BLOOD/URIC ACID: CPT | Performed by: NURSE PRACTITIONER

## 2023-01-13 ENCOUNTER — LAB REQUISITION (OUTPATIENT)
Dept: LAB | Facility: CLINIC | Age: 63
End: 2023-01-13

## 2023-01-13 DIAGNOSIS — M10.072 IDIOPATHIC GOUT, LEFT ANKLE AND FOOT: ICD-10-CM

## 2023-01-13 DIAGNOSIS — I10 ESSENTIAL (PRIMARY) HYPERTENSION: ICD-10-CM

## 2023-01-13 LAB
ALBUMIN SERPL BCG-MCNC: 3.9 G/DL (ref 3.5–5.2)
ALP SERPL-CCNC: 72 U/L (ref 40–129)
ALT SERPL W P-5'-P-CCNC: 38 U/L (ref 10–50)
ANION GAP SERPL CALCULATED.3IONS-SCNC: 12 MMOL/L (ref 7–15)
AST SERPL W P-5'-P-CCNC: 34 U/L (ref 10–50)
BILIRUB SERPL-MCNC: 0.4 MG/DL
BUN SERPL-MCNC: 13.5 MG/DL (ref 8–23)
CALCIUM SERPL-MCNC: 9 MG/DL (ref 8.8–10.2)
CHLORIDE SERPL-SCNC: 106 MMOL/L (ref 98–107)
CREAT SERPL-MCNC: 1.21 MG/DL (ref 0.67–1.17)
DEPRECATED HCO3 PLAS-SCNC: 23 MMOL/L (ref 22–29)
GFR SERPL CREATININE-BSD FRML MDRD: 68 ML/MIN/1.73M2
GLUCOSE SERPL-MCNC: 147 MG/DL (ref 70–99)
POTASSIUM SERPL-SCNC: 4 MMOL/L (ref 3.4–5.3)
PROT SERPL-MCNC: 6.8 G/DL (ref 6.4–8.3)
SODIUM SERPL-SCNC: 141 MMOL/L (ref 136–145)
URATE SERPL-MCNC: 8.8 MG/DL (ref 3.4–7)

## 2023-01-13 PROCEDURE — 84550 ASSAY OF BLOOD/URIC ACID: CPT | Performed by: FAMILY MEDICINE

## 2023-01-13 PROCEDURE — 80053 COMPREHEN METABOLIC PANEL: CPT | Performed by: FAMILY MEDICINE

## 2023-01-27 ENCOUNTER — TRANSFERRED RECORDS (OUTPATIENT)
Dept: HEALTH INFORMATION MANAGEMENT | Facility: CLINIC | Age: 63
End: 2023-01-27

## 2023-07-18 ENCOUNTER — APPOINTMENT (OUTPATIENT)
Dept: INTERPRETER SERVICES | Facility: CLINIC | Age: 63
End: 2023-07-18
Payer: COMMERCIAL

## 2023-08-25 ENCOUNTER — TRANSFERRED RECORDS (OUTPATIENT)
Dept: HEALTH INFORMATION MANAGEMENT | Facility: CLINIC | Age: 63
End: 2023-08-25

## 2023-08-25 ENCOUNTER — LAB REQUISITION (OUTPATIENT)
Dept: LAB | Facility: CLINIC | Age: 63
End: 2023-08-25

## 2023-08-25 DIAGNOSIS — I10 ESSENTIAL (PRIMARY) HYPERTENSION: ICD-10-CM

## 2023-08-25 DIAGNOSIS — I71.21 ANEURYSM OF THE ASCENDING AORTA, WITHOUT RUPTURE (H): ICD-10-CM

## 2023-08-25 DIAGNOSIS — M10.072 IDIOPATHIC GOUT, LEFT ANKLE AND FOOT: ICD-10-CM

## 2023-08-25 LAB
ALBUMIN SERPL BCG-MCNC: 4.2 G/DL (ref 3.5–5.2)
ALP SERPL-CCNC: 71 U/L (ref 40–129)
ALT SERPL W P-5'-P-CCNC: 44 U/L (ref 0–70)
ANION GAP SERPL CALCULATED.3IONS-SCNC: 11 MMOL/L (ref 7–15)
AST SERPL W P-5'-P-CCNC: 36 U/L (ref 0–45)
BILIRUB SERPL-MCNC: 0.3 MG/DL
BUN SERPL-MCNC: 28 MG/DL (ref 8–23)
CALCIUM SERPL-MCNC: 8.7 MG/DL (ref 8.8–10.2)
CHLORIDE SERPL-SCNC: 108 MMOL/L (ref 98–107)
CHOLEST SERPL-MCNC: 221 MG/DL
CREAT SERPL-MCNC: 1.3 MG/DL (ref 0.67–1.17)
DEPRECATED HCO3 PLAS-SCNC: 23 MMOL/L (ref 22–29)
GFR SERPL CREATININE-BSD FRML MDRD: 63 ML/MIN/1.73M2
GLUCOSE SERPL-MCNC: 155 MG/DL (ref 70–99)
HDLC SERPL-MCNC: 31 MG/DL
LDLC SERPL CALC-MCNC: 144 MG/DL
NONHDLC SERPL-MCNC: 190 MG/DL
POTASSIUM SERPL-SCNC: 3.7 MMOL/L (ref 3.4–5.3)
PROT SERPL-MCNC: 6.9 G/DL (ref 6.4–8.3)
SODIUM SERPL-SCNC: 142 MMOL/L (ref 136–145)
TRIGL SERPL-MCNC: 232 MG/DL
URATE SERPL-MCNC: 9 MG/DL (ref 3.4–7)

## 2023-08-25 PROCEDURE — 80061 LIPID PANEL: CPT | Performed by: FAMILY MEDICINE

## 2023-08-25 PROCEDURE — 80053 COMPREHEN METABOLIC PANEL: CPT | Performed by: FAMILY MEDICINE

## 2023-08-25 PROCEDURE — 84550 ASSAY OF BLOOD/URIC ACID: CPT | Performed by: FAMILY MEDICINE

## 2023-10-13 ENCOUNTER — TELEPHONE (OUTPATIENT)
Dept: SLEEP MEDICINE | Facility: CLINIC | Age: 63
End: 2023-10-13

## 2023-10-13 NOTE — TELEPHONE ENCOUNTER
General Call    Contacts         Type Contact Phone/Fax    10/13/2023 04:25 PM CDT Phone (Incoming) Marcelle Posadas (Self) [phone] (H)          Reason for Call:  Prescription for mask that covers his face.      What are your questions or concerns:  Marcelle needs a new face mask, and is wondering if he needs an appointment, or if he can get a new prescription.      Date of last appointment with provider: ?    Okay to leave a detailed message?: Yes at Cell number on file:    Telephone Information:   Mobile [phone]

## 2023-11-13 ENCOUNTER — OFFICE VISIT (OUTPATIENT)
Dept: CARDIOLOGY | Facility: CLINIC | Age: 63
End: 2023-11-13
Payer: COMMERCIAL

## 2023-11-13 VITALS
SYSTOLIC BLOOD PRESSURE: 106 MMHG | WEIGHT: 209 LBS | DIASTOLIC BLOOD PRESSURE: 70 MMHG | OXYGEN SATURATION: 96 % | HEART RATE: 58 BPM | BODY MASS INDEX: 33.73 KG/M2 | RESPIRATION RATE: 20 BRPM

## 2023-11-13 DIAGNOSIS — I10 ESSENTIAL HYPERTENSION, BENIGN: ICD-10-CM

## 2023-11-13 DIAGNOSIS — E78.00 ELEVATED LDL CHOLESTEROL LEVEL: ICD-10-CM

## 2023-11-13 DIAGNOSIS — R06.09 EXERTIONAL DYSPNEA: Primary | ICD-10-CM

## 2023-11-13 DIAGNOSIS — I77.89 ASCENDING AORTA ENLARGEMENT (H): ICD-10-CM

## 2023-11-13 PROCEDURE — 99214 OFFICE O/P EST MOD 30 MIN: CPT | Performed by: INTERNAL MEDICINE

## 2023-11-13 RX ORDER — ALLOPURINOL 300 MG/1
1 TABLET ORAL 2 TIMES DAILY
COMMUNITY

## 2023-11-13 NOTE — LETTER
11/13/2023    Sharda Briseno MD  [address] [place] [address]    RE: Marcelle Posadas       Dear Colleague,     I had the pleasure of seeing Marcelle Posadas in the [facility].    HEART CARE ENCOUNTER CONSULTATON NOTE      [facility]  [phone]      Assessment/Recommendations   Assessment:    1.  Ascending thoracic Aneurysm (47x48 mm by CT chest on 1/27/2023, stable from 2021).  Aortic valve is document is trileaflet by echo.  2.  Essential hypertension, need very tight controlled.  Goal <120/60, controlled.   3.  Minimal CAD on prior angiogram.   4. Elevated LDL   5. Exertional dyspnea        Plan:  Exercise stress echo given exertional dyspnea.  Pulmonary function testing given dyspnea on exertion.  3.  Metoprolol XL 50 mg daily (can be held for stress testing)   4.  Lisinopril 20 mg daily, titrate as tolerated  5.  Continue amlodipine 10 mg daily for hypertension.   6.  Repeat CTA or Cardiac MRA chest in 12 months  7.  Would consider statin therapy in this patient         History of Present Illness/Subjective    HPI: Mracelle Posadas is a 63 year old male history of ascending thoracic aortic enlargement who presents to cardiology to reestablish care with our team to manage ascending aortic aneurysm and hypertension.    Since last clinic evaluation patient continues to note dyspnea exertion.  He notes worsening dyspnea last few months.  We will obtain stress echo to evaluate mild aortic valve regurgitation from prior echo.  Also evaluate for ischemia.  On examination he has poor airflow with deep inspiration and exhalation we will obtain pulm function test.    Discussed with patient need to consider pulmonary function testing and stress test.  He is willing to undergo both.    ECHO: 2022  1. The left ventricle is normal in size. Left ventricular function is  normal.The ejection fraction is 60-65%. There is mild concentric left  ventricular hypertrophy. No regional wall motion  abnormalities noted.  2. Normal right ventricle size and systolic function.  3. The left atrium is moderately dilated.  4. There is mild (1+) tricuspid regurgitation.  5. There is mild (1+) aortic regurgitation.  6. Right ventricle systolic pressure estimate normal  7. The ascending aorta is Moderately dilated at 47 mm. There is mild aortic  root enlargement at 42 mm.    Coronary Angiogram: 4/15/2022  Left main is short with mild disease  LAD has mild atherosclerosis no obstructive disease  Left circumflex is large and dominant with mild disease  RCA is small and nondominant with mild disease    Cardiac MRI, 2019  IMPRESSIONS:  1. Normal left ventricular size, wall thickness and systolic function. The quantified left ventricular ejection fraction is  67.4%. No myocardial scar is identified.  2. Normal right ventricular size and systolic function.  3. No significant valvular abnormalities.  4. Moderate-sized ascending aorta aneurysm measuring a maximal diameter of 47 mm.    Patient is reestablishing care at the request of Dr. Briseno     Physical Examination  Review of Systems   Vitals: /70 (BP Location: Left arm, Patient Position: Sitting, Cuff Size: Adult Large)   Pulse 58   Resp 20   Wt 94.8 kg (209 lb)   SpO2 96%   BMI 33.73 kg/m    BMI= Body mass index is 33.73 kg/m .  Wt Readings from Last 3 Encounters:   11/13/23 94.8 kg (209 lb)   04/15/22 84.1 kg (185 lb 8 oz)   02/04/22 84.8 kg (187 lb)       General Appearance:   no distress, normal body habitus   ENT/Mouth: membranes moist, no oral lesions or bleeding gums.      EYES:  no scleral icterus, normal conjunctivae   Neck: no carotid bruits or thyromegaly   Chest/Lungs:   lungs are clear to auscultation, no rales or wheezing, no sternal scar, equal chest wall expansion    Cardiovascular:   Regular. Normal first and second heart sounds with faint systolic murmur. No rubs, or gallops; the carotid, radial and posterior tibial pulses are intact, Jugular  venous pressure normal, no pitting edema bilaterally    Abdomen:  no organomegaly, masses, bruits, or tenderness; bowel sounds are present   Extremities: no cyanosis or clubbing   Skin: no xanthelasma, warm.    Neurologic: normal  bilateral, no tremors     Psychiatric: alert and oriented x3, calm        Please refer above for cardiac ROS details.        Medical History  Surgical History Family History Social History   Ascending aortic aneurysm with  no prior cardiac surgeries Family History   Problem Relation Age of Onset    Cancer Father         Social History     Socioeconomic History    Marital status:      Spouse name: Not on file    Number of children: Not on file    Years of education: Not on file    Highest education level: Not on file   Occupational History    Not on file   Tobacco Use    Smoking status: Never    Smokeless tobacco: Never   Substance and Sexual Activity    Alcohol use: No    Drug use: No    Sexual activity: Never   Other Topics Concern    Parent/sibling w/ CABG, MI or angioplasty before 65F 55M? No   Social History Narrative    Not on file     Social Determinants of Health     Financial Resource Strain: Not on file   Food Insecurity: Not on file   Transportation Needs: Not on file   Physical Activity: Not on file   Stress: Not on file   Social Connections: Not on file   Interpersonal Safety: Not on file   Housing Stability: Not on file           Medications  Allergies   Current Outpatient Medications   Medication Sig Dispense Refill    allopurinol (ZYLOPRIM) 300 MG tablet Take 1 tablet by mouth 2 times daily      amLODIPine (NORVASC) 10 MG tablet [AMLODIPINE (NORVASC) 10 MG TABLET] Take 1 tablet (10 mg total) by mouth daily. 90 tablet 3    blood pressure monitor Kit [BLOOD PRESSURE MONITOR KIT] Check blood pressure 3 times a day 1 each 0    ketotifen fumarate 0.035%, ketotifen 0.025%, (ZADITOR) 0.025 % ophthalmic solution Place 1 drop into both eyes daily      lisinopril (ZESTRIL)  20 MG tablet Take 1 tablet (20 mg) by mouth daily 90 tablet 3    metoprolol succinate ER (TOPROL-XL) 50 MG 24 hr tablet Take 1 tablet (50 mg) by mouth daily 90 tablet 3     No Known Allergies       Lab Results    Chemistry/lipid CBC Cardiac Enzymes/BNP/TSH/INR   Recent Labs   Lab Test 09/03/21  1141   CHOL 195   HDL 46   *   TRIG 66     Recent Labs   Lab Test 09/03/21  1141 02/26/21  0931   * 135*     Recent Labs   Lab Test 01/14/22  1455      POTASSIUM 3.5   CHLORIDE 107   CO2 23      BUN 15   CR 1.22   GFRESTIMATED 68   ZAHIRA 9.1     Recent Labs   Lab Test 01/14/22  1455 09/03/21  1141 02/26/21  0931   CR 1.22 1.38* 1.09     No results for input(s): A1C in the last 89060 hours.       Recent Labs   Lab Test 01/25/19  0900   WBC 8.7   HGB 14.4   HCT 43.7   MCV 95     Recent Labs   Lab Test 01/25/19  0900   HGB 14.4    No results for input(s): TROPONINI in the last 34420 hours.  No results for input(s): BNP, NTBNPI, NTBNP in the last 99656 hours.  No results for input(s): TSH in the last 90631 hours.  No results for input(s): INR in the last 52024 hours.     Yoan Head DO      Thank you for allowing me to participate in the care of your patient.      Sincerely,     Yoan Head DO   [facility]  cc: No referring provider defined for this encounter.

## 2023-11-13 NOTE — PROGRESS NOTES
HEART CARE ENCOUNTER CONSULTATON NOTE      [facility]  [phone]      Assessment/Recommendations   Assessment:    1.  Ascending thoracic Aneurysm (47x48 mm by CT chest on 1/27/2023, stable from 2021).  Aortic valve is document is trileaflet by echo.  2.  Essential hypertension, need very tight controlled.  Goal <120/60, controlled.   3.  Minimal CAD on prior angiogram.   4. Elevated LDL   5. Exertional dyspnea        Plan:  Exercise stress echo given exertional dyspnea.  Pulmonary function testing given dyspnea on exertion.  3.  Metoprolol XL 50 mg daily (can be held for stress testing)   4.  Lisinopril 20 mg daily, titrate as tolerated  5.  Continue amlodipine 10 mg daily for hypertension.   6.  Repeat CTA or Cardiac MRA chest in 12 months  7.  Would consider statin therapy in this patient         History of Present Illness/Subjective    HPI: Marcelle Posadas is a 63 year old male history of ascending thoracic aortic enlargement who presents to cardiology to reestablish care with our team to manage ascending aortic aneurysm and hypertension.    Since last clinic evaluation patient continues to note dyspnea exertion.  He notes worsening dyspnea last few months.  We will obtain stress echo to evaluate mild aortic valve regurgitation from prior echo.  Also evaluate for ischemia.  On examination he has poor airflow with deep inspiration and exhalation we will obtain pulm function test.    Discussed with patient need to consider pulmonary function testing and stress test.  He is willing to undergo both.    ECHO: 2022  1. The left ventricle is normal in size. Left ventricular function is  normal.The ejection fraction is 60-65%. There is mild concentric left  ventricular hypertrophy. No regional wall motion abnormalities noted.  2. Normal right ventricle size and systolic function.  3. The left atrium is moderately dilated.  4. There is mild (1+) tricuspid regurgitation.  5. There is mild (1+) aortic  regurgitation.  6. Right ventricle systolic pressure estimate normal  7. The ascending aorta is Moderately dilated at 47 mm. There is mild aortic  root enlargement at 42 mm.    Coronary Angiogram: 4/15/2022  Left main is short with mild disease  LAD has mild atherosclerosis no obstructive disease  Left circumflex is large and dominant with mild disease  RCA is small and nondominant with mild disease    Cardiac MRI, 2019  IMPRESSIONS:  1. Normal left ventricular size, wall thickness and systolic function. The quantified left ventricular ejection fraction is  67.4%. No myocardial scar is identified.  2. Normal right ventricular size and systolic function.  3. No significant valvular abnormalities.  4. Moderate-sized ascending aorta aneurysm measuring a maximal diameter of 47 mm.    Patient is reestablishing care at the request of Dr. Briseno     Physical Examination  Review of Systems   Vitals: /70 (BP Location: Left arm, Patient Position: Sitting, Cuff Size: Adult Large)   Pulse 58   Resp 20   Wt 94.8 kg (209 lb)   SpO2 96%   BMI 33.73 kg/m    BMI= Body mass index is 33.73 kg/m .  Wt Readings from Last 3 Encounters:   11/13/23 94.8 kg (209 lb)   04/15/22 84.1 kg (185 lb 8 oz)   02/04/22 84.8 kg (187 lb)       General Appearance:   no distress, normal body habitus   ENT/Mouth: membranes moist, no oral lesions or bleeding gums.      EYES:  no scleral icterus, normal conjunctivae   Neck: no carotid bruits or thyromegaly   Chest/Lungs:   lungs are clear to auscultation, no rales or wheezing, no sternal scar, equal chest wall expansion    Cardiovascular:   Regular. Normal first and second heart sounds with faint systolic murmur. No rubs, or gallops; the carotid, radial and posterior tibial pulses are intact, Jugular venous pressure normal, no pitting edema bilaterally    Abdomen:  no organomegaly, masses, bruits, or tenderness; bowel sounds are present   Extremities: no cyanosis or clubbing   Skin: no  xanthelasma, warm.    Neurologic: normal  bilateral, no tremors     Psychiatric: alert and oriented x3, calm        Please refer above for cardiac ROS details.        Medical History  Surgical History Family History Social History   Ascending aortic aneurysm with  no prior cardiac surgeries Family History   Problem Relation Age of Onset    Cancer Father         Social History     Socioeconomic History    Marital status:      Spouse name: Not on file    Number of children: Not on file    Years of education: Not on file    Highest education level: Not on file   Occupational History    Not on file   Tobacco Use    Smoking status: Never    Smokeless tobacco: Never   Substance and Sexual Activity    Alcohol use: No    Drug use: No    Sexual activity: Never   Other Topics Concern    Parent/sibling w/ CABG, MI or angioplasty before 65F 55M? No   Social History Narrative    Not on file     Social Determinants of Health     Financial Resource Strain: Not on file   Food Insecurity: Not on file   Transportation Needs: Not on file   Physical Activity: Not on file   Stress: Not on file   Social Connections: Not on file   Interpersonal Safety: Not on file   Housing Stability: Not on file           Medications  Allergies   Current Outpatient Medications   Medication Sig Dispense Refill    allopurinol (ZYLOPRIM) 300 MG tablet Take 1 tablet by mouth 2 times daily      amLODIPine (NORVASC) 10 MG tablet [AMLODIPINE (NORVASC) 10 MG TABLET] Take 1 tablet (10 mg total) by mouth daily. 90 tablet 3    blood pressure monitor Kit [BLOOD PRESSURE MONITOR KIT] Check blood pressure 3 times a day 1 each 0    ketotifen fumarate 0.035%, ketotifen 0.025%, (ZADITOR) 0.025 % ophthalmic solution Place 1 drop into both eyes daily      lisinopril (ZESTRIL) 20 MG tablet Take 1 tablet (20 mg) by mouth daily 90 tablet 3    metoprolol succinate ER (TOPROL-XL) 50 MG 24 hr tablet Take 1 tablet (50 mg) by mouth daily 90 tablet 3     No Known  Allergies       Lab Results    Chemistry/lipid CBC Cardiac Enzymes/BNP/TSH/INR   Recent Labs   Lab Test 09/03/21  1141   CHOL 195   HDL 46   *   TRIG 66     Recent Labs   Lab Test 09/03/21  1141 02/26/21  0931   * 135*     Recent Labs   Lab Test 01/14/22  1455      POTASSIUM 3.5   CHLORIDE 107   CO2 23      BUN 15   CR 1.22   GFRESTIMATED 68   ZAHIRA 9.1     Recent Labs   Lab Test 01/14/22  1455 09/03/21  1141 02/26/21  0931   CR 1.22 1.38* 1.09     No results for input(s): A1C in the last 97367 hours.       Recent Labs   Lab Test 01/25/19  0900   WBC 8.7   HGB 14.4   HCT 43.7   MCV 95     Recent Labs   Lab Test 01/25/19  0900   HGB 14.4    No results for input(s): TROPONINI in the last 87142 hours.  No results for input(s): BNP, NTBNPI, NTBNP in the last 49174 hours.  No results for input(s): TSH in the last 90292 hours.  No results for input(s): INR in the last 13269 hours.     Yoan Head DO

## 2023-11-14 NOTE — TELEPHONE ENCOUNTER
Over 4 years since last appointment. New patient appointment scheduled 12/13/23.    Renee GARCIA RN  [facility]

## 2023-11-17 ENCOUNTER — HOSPITAL ENCOUNTER (OUTPATIENT)
Dept: RESPIRATORY THERAPY | Facility: CLINIC | Age: 63
Discharge: HOME | End: 2023-11-17
Attending: INTERNAL MEDICINE | Admitting: INTERNAL MEDICINE
Payer: COMMERCIAL

## 2023-11-17 DIAGNOSIS — R06.09 EXERTIONAL DYSPNEA: ICD-10-CM

## 2023-11-17 PROCEDURE — 94729 DIFFUSING CAPACITY: CPT

## 2023-11-17 PROCEDURE — 94060 EVALUATION OF WHEEZING: CPT

## 2023-11-17 PROCEDURE — 271N000002 HC RX 271

## 2023-11-17 RX ORDER — INHALER, ASSIST DEVICES
1 SPACER (EA) MISCELLANEOUS ONCE
Status: COMPLETED | OUTPATIENT
Start: 2023-11-17 | End: 2023-11-17

## 2023-11-17 RX ADMIN — Medication 1 EACH: at 09:23

## 2023-11-17 NOTE — PROGRESS NOTES
Pre and post spirometry and DLCO completed.  Patient was given 4 puffs Albuterol MDI before post spirometry.

## 2023-11-20 LAB
DLCOUNC-%PRED-PRE: 83 %
DLCOUNC-PRE: 20.12 ML/MIN/MMHG
DLCOUNC-PRED: 24.16 ML/MIN/MMHG
ERV-%PRED-PRE: 20 %
ERV-PRE: 0.27 L
ERV-PRED: 1.33 L
EXPTIME-PRE: 8.48 SEC
FEF2575-%PRED-POST: 77 %
FEF2575-%PRED-PRE: 75 %
FEF2575-POST: 1.89 L/SEC
FEF2575-PRE: 1.85 L/SEC
FEF2575-PRED: 2.46 L/SEC
FEFMAX-%PRED-PRE: 82 %
FEFMAX-PRE: 6.75 L/SEC
FEFMAX-PRED: 8.18 L/SEC
FEV1-%PRED-PRE: 71 %
FEV1-PRE: 2.05 L
FEV1FEV6-PRE: 79 %
FEV1FEV6-PRED: 79 %
FEV1FVC-PRE: 79 %
FEV1FVC-PRED: 79 %
FEV1SVC-PRE: 70 %
FEV1SVC-PRED: 72 %
FIFMAX-PRE: 4.96 L/SEC
FVC-%PRED-PRE: 71 %
FVC-PRE: 2.59 L
FVC-PRED: 3.64 L
IC-%PRED-PRE: 100 %
IC-PRE: 2.68 L
IC-PRED: 2.67 L
VA-%PRED-PRE: 70 %
VA-PRE: 3.96 L
VC-%PRED-PRE: 73 %
VC-PRE: 2.95 L
VC-PRED: 3.99 L

## 2023-12-08 ENCOUNTER — HOSPITAL ENCOUNTER (OUTPATIENT)
Dept: CARDIOLOGY | Facility: HOSPITAL | Age: 63
Discharge: HOME | End: 2023-12-08
Attending: INTERNAL MEDICINE | Admitting: INTERNAL MEDICINE
Payer: COMMERCIAL

## 2023-12-08 DIAGNOSIS — R06.09 EXERTIONAL DYSPNEA: ICD-10-CM

## 2023-12-08 PROCEDURE — 93325 DOPPLER ECHO COLOR FLOW MAPG: CPT | Mod: 26 | Performed by: INTERNAL MEDICINE

## 2023-12-08 PROCEDURE — 93350 STRESS TTE ONLY: CPT | Mod: TC

## 2023-12-08 PROCEDURE — 93018 CV STRESS TEST I&R ONLY: CPT | Performed by: INTERNAL MEDICINE

## 2023-12-08 PROCEDURE — 93017 CV STRESS TEST TRACING ONLY: CPT

## 2023-12-08 PROCEDURE — 93350 STRESS TTE ONLY: CPT | Mod: 26 | Performed by: INTERNAL MEDICINE

## 2023-12-08 PROCEDURE — 93321 DOPPLER ECHO F-UP/LMTD STD: CPT | Mod: 26 | Performed by: INTERNAL MEDICINE

## 2023-12-08 PROCEDURE — 93016 CV STRESS TEST SUPVJ ONLY: CPT | Performed by: INTERNAL MEDICINE

## 2023-12-12 NOTE — PROGRESS NOTES
Does Marcelle have a CPAP/Bipap?  Yes       Type of mask: nasal    [employer] Sleep Scale: 0  Stop Ban  BMI: 33.73  Neck Circumference:45cm

## 2023-12-13 ENCOUNTER — OFFICE VISIT (OUTPATIENT)
Dept: SLEEP MEDICINE | Facility: CLINIC | Age: 63
End: 2023-12-13
Payer: COMMERCIAL

## 2023-12-13 VITALS
BODY MASS INDEX: 33.59 KG/M2 | WEIGHT: 209 LBS | HEART RATE: 68 BPM | HEIGHT: 66 IN | SYSTOLIC BLOOD PRESSURE: 100 MMHG | OXYGEN SATURATION: 97 % | DIASTOLIC BLOOD PRESSURE: 60 MMHG

## 2023-12-13 DIAGNOSIS — G47.33 OSA (OBSTRUCTIVE SLEEP APNEA): Primary | ICD-10-CM

## 2023-12-13 PROCEDURE — 99203 OFFICE O/P NEW LOW 30 MIN: CPT | Performed by: PHYSICIAN ASSISTANT

## 2023-12-13 ASSESSMENT — SLEEP AND FATIGUE QUESTIONNAIRES
HOW LIKELY ARE YOU TO NOD OFF OR FALL ASLEEP WHILE SITTING QUIETLY AFTER LUNCH WITHOUT ALCOHOL: WOULD NEVER DOZE
HOW LIKELY ARE YOU TO NOD OFF OR FALL ASLEEP WHILE SITTING AND TALKING TO SOMEONE: WOULD NEVER DOZE
HOW LIKELY ARE YOU TO NOD OFF OR FALL ASLEEP WHILE LYING DOWN TO REST IN THE AFTERNOON WHEN CIRCUMSTANCES PERMIT: WOULD NEVER DOZE
HOW LIKELY ARE YOU TO NOD OFF OR FALL ASLEEP WHILE WATCHING TV: WOULD NEVER DOZE
HOW LIKELY ARE YOU TO NOD OFF OR FALL ASLEEP WHEN YOU ARE A PASSENGER IN A CAR FOR AN HOUR WITHOUT A BREAK: WOULD NEVER DOZE
HOW LIKELY ARE YOU TO NOD OFF OR FALL ASLEEP WHILE SITTING INACTIVE IN A PUBLIC PLACE: WOULD NEVER DOZE
HOW LIKELY ARE YOU TO NOD OFF OR FALL ASLEEP IN A CAR, WHILE STOPPED FOR A FEW MINUTES IN TRAFFIC: WOULD NEVER DOZE
HOW LIKELY ARE YOU TO NOD OFF OR FALL ASLEEP WHILE SITTING AND READING: WOULD NEVER DOZE

## 2023-12-13 NOTE — PROGRESS NOTES
Outpatient Sleep Medicine Consultation:      Name: Marcelle Posadas MRN# [id number]   Age: 63 year old YOB: 1960     Date of Consultation: December 13, 2023  Consultation is requested by: No referring provider defined for this encounter. No ref. provider found  Primary care provider: Sharda Briseno       Reason for Sleep Consult:     Marcelle Posadas is sent by No ref. provider found for a sleep consultation regarding Severe sleep apnea per 2018 study. He has not been seen for 3-4 years. He would like updated orders for supplies. His nasal mask hurts his nose and he would like to try a different mask.     Patient s Reason for visit  Marcelle Posadas main reason for visit: follow up  Patient states problem(s) started: 4 years  Marcelle Posadas's goals for this visit: change cpap           Assessment and Plan:     Summary Sleep Diagnoses:  Severe sleep apnea per 2018 study    Comorbid Diagnoses:  HTN      Summary Recommendations:  Supplies ordered, he has a appointment this Friday for mask fitting in [place].   Orders Placed This Encounter   Procedures    Comprehensive DME         Summary Counseling:    Sleep Testing Reviewed  Obstructive Sleep Apnea Reviewed  Complications of Untreated Sleep Apnea Reviewed      Medical Decision-making:   Educational materials provided in instructions    Total time spent reviewing medical records, history and physical examination, review of previous testing and interpretation as well as documentation on this date:45 min    CC: No ref. provider found          History of Present Illness:     Past Sleep Evaluations:    SLEEP-WAKE SCHEDULE:     Work/School Days: Patient goes to school/work: Yes   Usually gets into bed at 10pm  Takes patient about 30_45 min to fall asleep  Has trouble falling asleep 2 nights per week  Wakes up in the middle of the night 0 times.  Wakes up due to Other  He has trouble falling back asleep   times a week.   It usually takes   to get back to sleep  Patient is usually up at  430am  Uses alarm: Yes    Weekends/Non-work Days/All Other Days:  Usually gets into bed at 11-12pm   Takes patient about 30-45 min to fall asleep  Patient is usually up at 7-8am  Uses alarm: No    Sleep Need  Patient gets  6-7 hours sleep on average   Patient thinks he needs about 7-8 hours sleep    Marcelle Posadas prefers to sleep in this position(s): Back;Side   Patient states they do the following activities in bed:      Naps  Patient takes a purposeful nap   times a week and naps are usually 1 hour in duration  He feels better after a nap: Yes  He dozes off unintentionally 2 days per week  Patient has had a driving accident or near-miss due to sleepiness/drowsiness: No      SLEEP DISRUPTIONS:    Breathing/Snoring  Patient snores:Yes  Other people complain about his snoring: Yes  Patient has been told he stops breathing in his sleep:Yes  He has issues with the following:      Movement:  Patient gets pain, discomfort, with an urge to move:  No  It happens when he is resting:  No  It happens more at night:  No  Patient has been told he kicks his legs at night:  No     Behaviors in Sleep:  Marcelle Posadas has experienced the following behaviors while sleeping:    He has experienced sudden muscle weakness during the day: No      Is there anything else you would like your sleep provider to know: no        CAFFEINE AND OTHER SUBSTANCES:    Patient consumes caffeinated beverages per day:  1  Last caffeine use is usually: 9am  List of any prescribed or over the counter stimulants that patient takes:    List of any prescribed or over the counter sleep medication patient takes: none  List of previous sleep medications that patient has tried: none  Patient drinks alcohol to help them sleep: No  Patient drinks alcohol near bedtime: No    Family History:  Patient has a family member been diagnosed with a sleep disorder: No            SCALES:    EPWORTH SLEEPINESS SCALE         12/13/2023     2:08 PM    [place] Sleepiness Scale ( [employer]   [id number]<br>ESS - [place]/English - Final version - 21 Nov 07 - [facility] [place].)   Sitting and reading Would never doze   Watching TV Would never doze   Sitting, inactive in a public place (e.g. a theatre or a meeting) Would never doze   As a passenger in a car for an hour without a break Would never doze   Lying down to rest in the afternoon when circumstances permit Would never doze   Sitting and talking to someone Would never doze   Sitting quietly after a lunch without alcohol Would never doze   In a car, while stopped for a few minutes in traffic Would never doze   [place] Score (MC) 0   [place] Score (Sleep) 0         INSOMNIA SEVERITY INDEX (JAMILA)          12/13/2023     1:54 PM   Insomnia Severity Index (JAMILA)   Difficulty falling asleep 3   Difficulty staying asleep 2   Problems waking up too early 1   How SATISFIED/DISSATISFIED are you with your CURRENT sleep pattern? 4   How NOTICEABLE to others do you think your sleep problem is in terms of impairing the quality of your life? 2   How WORRIED/DISTRESSED are you about your current sleep problem? 3   To what extent do you consider your sleep problem to INTERFERE with your daily functioning (e.g. daytime fatigue, mood, ability to function at work/daily chores, concentration, memory, mood, etc.) CURRENTLY? 4   JAMILA Total Score 19       Guidelines for Scoring/Interpretation:  Total score categories:  0-7 = No clinically significant insomnia   8-14 = Subthreshold insomnia   15-21 = Clinical insomnia (moderate severity)  22-28 = Clinical insomnia (severe)  Used via courtesy of www.[employer] Power[employer].va.gov with permission from Chidi Lucas PhD., [facility]      STOP BANG         12/13/2023     2:11 PM   STOP BANG Questionnaire (  2008, the American Society of Anesthesiologists, Inc. [employer]   1. Snoring - Do you snore loudly (louder than talking or loud enough to be heard through closed doors)? Yes   2. Tired - Do you often feel  "tired, fatigued, or sleepy during daytime? No   3. Observed - Has anyone observed you stop breathing during your sleep? Yes   4. Blood pressure - Do you have or are you being treated for high blood pressure? Yes   5. BMI - BMI more than 35 kg/m2? No   6. Age - Age over 50 yr old? Yes   7. Neck circumference - Neck circumference greater than 40 cm? Yes   8. Gender - Gender male? Yes   STOP BANG Score (MC): 5 (High risk of INES)         GAD7         No data to display                    CAGE-AID         No data to display                  CAGE-AID reprinted with permission from the [place] Medical Journal, ORTEGA Newton and QUIRINO Blanco, \"Conjoint screening questionnaires for alcohol and drug abuse\" [place] Medical Journal 94: 135-140, 1995.      PATIENT HEALTH QUESTIONNAIRE-9 (PHQ - 9)         No data to display                  Developed by Yonas Cedeno, Lola Enrique, Ted Hooper and colleagues, with an educational cesar from [employer] No permission required to reproduce, translate, display or distribute.        Allergies:    No Known Allergies    Medications:    Current Outpatient Medications   Medication Sig Dispense Refill    allopurinol (ZYLOPRIM) 300 MG tablet Take 1 tablet by mouth 2 times daily      amLODIPine (NORVASC) 10 MG tablet [AMLODIPINE (NORVASC) 10 MG TABLET] Take 1 tablet (10 mg total) by mouth daily. 90 tablet 3    ketotifen fumarate 0.035%, ketotifen 0.025%, (ZADITOR) 0.025 % ophthalmic solution Place 1 drop into both eyes daily      lisinopril (ZESTRIL) 20 MG tablet Take 1 tablet (20 mg) by mouth daily 90 tablet 3    metoprolol succinate ER (TOPROL-XL) 50 MG 24 hr tablet Take 1 tablet (50 mg) by mouth daily 90 tablet 3    blood pressure monitor Kit [BLOOD PRESSURE MONITOR KIT] Check blood pressure 3 times a day 1 each 0       Problem List:  Patient Active Problem List    Diagnosis Date Noted    Chronic viral hepatitis B (H) 04/15/2022     Priority: Medium    Obesity 04/15/2022 "     Priority: Medium    Obstructive sleep apnea 04/15/2022     Priority: Medium    Exertional shortness of breath      Priority: Medium    Essential hypertension, benign 10/01/2018     Priority: Medium    Thoracic aortic aneurysm without rupture (H24) 10/01/2018     Priority: Medium        Past Medical/Surgical History:  Past Medical History:   Diagnosis Date    Class 1 obesity with serious comorbidity and body mass index (BMI) of 30.0 to 30.9 in adult     Hepatitis B infection     HLD (hyperlipidemia)     HTN (hypertension)     INES (obstructive sleep apnea)     Pulmonary nodules     Skull fracture (H)     2017     Past Surgical History:   Procedure Laterality Date    CV CORONARY ANGIOGRAM N/A 4/15/2022    Procedure: Coronary Angiogram;  Surgeon: Charlie Tinoco MD;  Location: [facility] CATH LAB CV    CV LEFT HEART CATH N/A 4/15/2022    Procedure: Left Heart Catheterization;  Surgeon: Charlie Tinoco MD;  Location: [facility] CATH LAB CV       Social History:  Social History     Socioeconomic History    Marital status:      Spouse name: Not on file    Number of children: Not on file    Years of education: Not on file    Highest education level: Not on file   Occupational History    Not on file   Tobacco Use    Smoking status: Never    Smokeless tobacco: Never   Substance and Sexual Activity    Alcohol use: No    Drug use: No    Sexual activity: Never   Other Topics Concern    Parent/sibling w/ CABG, MI or angioplasty before 65F 55M? No   Social History Narrative    Not on file     Social Determinants of Health     Financial Resource Strain: Not on file   Food Insecurity: Not on file   Transportation Needs: Not on file   Physical Activity: Not on file   Stress: Not on file   Social Connections: Not on file   Interpersonal Safety: Not on file   Housing Stability: Not on file       Family History:  Family History   Problem Relation Age of Onset    Cancer Father        Review of Systems:  A complete review of  "systems reviewed by me is negative with the exeption of what has been mentioned in the history of present illness.  In the last TWO WEEKS have you experienced any of the following symptoms?  Fevers: No  Night Sweats: No  Weight Gain: No  Pain at Night: No  Double Vision: No  Changes in Vision: No  Difficulty Breathing through Nose: No  Sore Throat in Morning: No  Dry Mouth in the Morning: No  Shortness of Breath Lying Flat: No  Shortness of Breath With Activity: No  Awakening with Shortness of Breath: No  Increased Cough: No  Heart Racing at Night: No  Swelling in Feet or Legs: No  Diarrhea at Night: No  Heartburn at Night: No  Urinating More than Once at Night: No  Losing Control of Urine at Night: No  Joint Pains at Night: No  Headaches in Morning: No  Weakness in Arms or Legs: No  Depressed Mood: No  Anxiety: No     Physical Examination:  Vitals: /60   Pulse 68   Ht 1.676 m (5' 6\")   Wt 94.8 kg (209 lb)   SpO2 97%   BMI 33.73 kg/m    BMI= Body mass index is 33.73 kg/m .    Neck Cir (cm): 45 cm      GENERAL APPEARANCE: healthy, alert, and no distress  EYES: Eyes grossly normal to inspection, PERRL, and conjunctivae and sclerae normal  HENT: nose and mouth without ulcers or lesions and oropharynx crowded  NECK: no adenopathy, no asymmetry, masses, or scars, and trachea midline and normal to palpation  RESP: lungs clear to auscultation - no rales, rhonchi or wheezes  CV: regular rates and rhythm, normal S1 S2, no S3 or S4, and no murmur, click or rub  MS: extremities normal- no gross deformities noted  PSYCH: mentation appears normal and affect normal/bright  Mallampati Class: IV.  Tonsillar Stage: 1  hidden by pillars.         Data: All pertinent previous laboratory data reviewed     Recent Labs   Lab Test 08/25/23  1043 01/13/23  1112    141   POTASSIUM 3.7 4.0   CHLORIDE 108* 106   CO2 23 23   ANIONGAP 11 12   * 147*   BUN 28.0* 13.5   CR 1.30* 1.21*   ZAHIRA 8.7* 9.0       Recent Labs   Lab " "Test 04/15/22  0833   WBC 6.1   RBC 4.40   HGB 14.2   HCT 42.3   MCV 96   MCH 32.3   MCHC 33.6   RDW 12.8          Recent Labs   Lab Test 08/25/23  1043   PROTTOTAL 6.9   ALBUMIN 4.2   BILITOTAL 0.3   ALKPHOS 71   AST 36   ALT 44       No results found for: \"TSH\"    No results found for: \"UAMP\", \"UBARB\", \"BENZODIAZEUR\", \"UCANN\", \"UCOC\", \"OPIT\", \"UPCP\"    No results found for: \"IRONSAT\", \"VA60772\", \"MATTEO\"    No results found for: \"PH\", \"PHARTERIAL\", \"PO2\", \"OL5STGHYNOE\", \"SAT\", \"PCO2\", \"HCO3\", \"BASEEXCESS\", \"NED\", \"BEB\"    @LABRCNTIPR(phv:4,pco2v:4,po2v:4,hco3v:4,boyd:4,o2per:4)@    Echocardiology: No results found for this or any previous visit (from the past 4320 hour(s)).    Chest x-ray: No results found for this or any previous visit from the past 365 days.      Chest CT: No results found for this or any previous visit from the past 365 days.      PFT: Most Recent [place] Pulmonary Function Testing    FVC-Pred   Date Value Ref Range Status   11/17/2023 3.64 L      FVC-Pre   Date Value Ref Range Status   11/17/2023 2.59 L      FVC-%Pred-Pre   Date Value Ref Range Status   11/17/2023 71 %      FEV1-Pre   Date Value Ref Range Status   11/17/2023 2.05 L      FEV1-%Pred-Pre   Date Value Ref Range Status   11/17/2023 71 %      FEV1FVC-Pred   Date Value Ref Range Status   11/17/2023 79 %      FEV1FVC-Pre   Date Value Ref Range Status   11/17/2023 79 %      No results found for: \"20029\"  FEFMax-Pred   Date Value Ref Range Status   11/17/2023 8.18 L/sec      FEFMax-Pre   Date Value Ref Range Status   11/17/2023 6.75 L/sec      FEFMax-%Pred-Pre   Date Value Ref Range Status   11/17/2023 82 %      ExpTime-Pre   Date Value Ref Range Status   11/17/2023 8.48 sec      FIFMax-Pre   Date Value Ref Range Status   11/17/2023 4.96 L/sec      FEV1FEV6-Pred   Date Value Ref Range Status   11/17/2023 79 %      FEV1FEV6-Pre   Date Value Ref Range Status   11/17/2023 79 %      No results found for: \"20055\"      Rigo Pritchard PA-C " 12/13/2023

## 2023-12-15 ENCOUNTER — DOCUMENTATION ONLY (OUTPATIENT)
Dept: SLEEP MEDICINE | Facility: CLINIC | Age: 63
End: 2023-12-15
Payer: COMMERCIAL

## 2023-12-15 NOTE — PROGRESS NOTES
Patient came to [place] for mask fitting appointment on December 15, 2023. Patient requested to switch masks because oral breathing. Discussed the following masks: AIRFIT F20 AND AIRFIT F30I Patient selected a Resmed Mask name: F20 Full Face mask size Large. *PT DECLINED USING MASK SELECTOR.*

## 2024-01-05 ENCOUNTER — DOCUMENTATION ONLY (OUTPATIENT)
Dept: SLEEP MEDICINE | Facility: CLINIC | Age: 64
End: 2024-01-05
Payer: COMMERCIAL

## 2024-01-05 NOTE — PROGRESS NOTES
DATE: 1/5/2024  LOCATION OF MASK FITTING: [place]  REASON FOR MASK FITTING: LEAKING AT THE BOTTOM OF THE MASK BY THE CHIN  DISCUSSED/VIEWED THE FOLLOWING MASKS: LOOKED AT OTHER FFM AND AIRFIT N20.    MASK AND SIZE SELECTED: AIRFIT F30I (SW)  MASK CLARIFICATION NEEDED: N    DOES PATIENT WEAR A MEDICAL DEVICE THAT WILL BE AFFECTED BY THE RESPIRONICS OR RESMED MAGNETIC MASK CONTRAINDICATION (IF YES, SELECT A MASK THAT DOES NOT HAVE MAGNETS)? N

## 2024-01-12 ENCOUNTER — TRANSFERRED RECORDS (OUTPATIENT)
Dept: MULTI SPECIALTY CLINIC | Facility: CLINIC | Age: 64
End: 2024-01-12

## 2024-01-12 ENCOUNTER — LAB REQUISITION (OUTPATIENT)
Dept: LAB | Facility: CLINIC | Age: 64
End: 2024-01-12

## 2024-01-12 DIAGNOSIS — M10.072 IDIOPATHIC GOUT, LEFT ANKLE AND FOOT: ICD-10-CM

## 2024-01-12 DIAGNOSIS — E83.51 HYPOCALCEMIA: ICD-10-CM

## 2024-01-12 PROCEDURE — 83735 ASSAY OF MAGNESIUM: CPT | Performed by: FAMILY MEDICINE

## 2024-01-12 PROCEDURE — 82306 VITAMIN D 25 HYDROXY: CPT | Performed by: FAMILY MEDICINE

## 2024-01-12 PROCEDURE — 84550 ASSAY OF BLOOD/URIC ACID: CPT | Performed by: FAMILY MEDICINE

## 2024-01-12 PROCEDURE — 80048 BASIC METABOLIC PNL TOTAL CA: CPT | Performed by: FAMILY MEDICINE

## 2024-01-13 LAB
ANION GAP SERPL CALCULATED.3IONS-SCNC: 14 MMOL/L (ref 7–15)
BUN SERPL-MCNC: 19 MG/DL (ref 8–23)
CALCIUM SERPL-MCNC: 8.5 MG/DL (ref 8.8–10.2)
CHLORIDE SERPL-SCNC: 105 MMOL/L (ref 98–107)
CREAT SERPL-MCNC: 1.34 MG/DL (ref 0.67–1.17)
DEPRECATED HCO3 PLAS-SCNC: 21 MMOL/L (ref 22–29)
EGFRCR SERPLBLD CKD-EPI 2021: 60 ML/MIN/1.73M2
GLUCOSE SERPL-MCNC: 104 MG/DL (ref 70–99)
MAGNESIUM SERPL-MCNC: 2.3 MG/DL (ref 1.7–2.3)
POTASSIUM SERPL-SCNC: 3.6 MMOL/L (ref 3.4–5.3)
SODIUM SERPL-SCNC: 140 MMOL/L (ref 135–145)
URATE SERPL-MCNC: 3.2 MG/DL (ref 3.4–7)
VIT D+METAB SERPL-MCNC: 16 NG/ML (ref 20–50)

## 2024-05-15 ENCOUNTER — OFFICE VISIT (OUTPATIENT)
Dept: FAMILY MEDICINE | Facility: CLINIC | Age: 64
End: 2024-05-15
Payer: COMMERCIAL

## 2024-05-15 VITALS
BODY MASS INDEX: 32.77 KG/M2 | OXYGEN SATURATION: 95 % | WEIGHT: 203 LBS | HEART RATE: 82 BPM | RESPIRATION RATE: 16 BRPM | TEMPERATURE: 97.7 F | DIASTOLIC BLOOD PRESSURE: 80 MMHG | SYSTOLIC BLOOD PRESSURE: 130 MMHG

## 2024-05-15 DIAGNOSIS — B18.1 CHRONIC VIRAL HEPATITIS B (H): ICD-10-CM

## 2024-05-15 DIAGNOSIS — I77.810 ASCENDING AORTA DILATATION (H): ICD-10-CM

## 2024-05-15 DIAGNOSIS — N18.2 CKD (CHRONIC KIDNEY DISEASE) STAGE 2, GFR 60-89 ML/MIN: ICD-10-CM

## 2024-05-15 DIAGNOSIS — I10 BENIGN ESSENTIAL HYPERTENSION: ICD-10-CM

## 2024-05-15 DIAGNOSIS — E55.9 VITAMIN D DEFICIENCY: Primary | ICD-10-CM

## 2024-05-15 DIAGNOSIS — R06.02 SOB (SHORTNESS OF BREATH): ICD-10-CM

## 2024-05-15 LAB
ALBUMIN SERPL BCG-MCNC: 4.2 G/DL (ref 3.5–5.2)
ALP SERPL-CCNC: 77 U/L (ref 40–150)
ALT SERPL W P-5'-P-CCNC: 41 U/L (ref 0–70)
ANION GAP SERPL CALCULATED.3IONS-SCNC: 11 MMOL/L (ref 7–15)
AST SERPL W P-5'-P-CCNC: 40 U/L (ref 0–45)
BILIRUB SERPL-MCNC: 0.4 MG/DL
BUN SERPL-MCNC: 17.7 MG/DL (ref 8–23)
CALCIUM SERPL-MCNC: 9.2 MG/DL (ref 8.8–10.2)
CHLORIDE SERPL-SCNC: 106 MMOL/L (ref 98–107)
CREAT SERPL-MCNC: 1.46 MG/DL (ref 0.67–1.17)
DEPRECATED HCO3 PLAS-SCNC: 24 MMOL/L (ref 22–29)
EGFRCR SERPLBLD CKD-EPI 2021: 54 ML/MIN/1.73M2
GLUCOSE SERPL-MCNC: 106 MG/DL (ref 70–99)
POTASSIUM SERPL-SCNC: 3.7 MMOL/L (ref 3.4–5.3)
PROT SERPL-MCNC: 7.4 G/DL (ref 6.4–8.3)
SODIUM SERPL-SCNC: 141 MMOL/L (ref 135–145)

## 2024-05-15 PROCEDURE — 36415 COLL VENOUS BLD VENIPUNCTURE: CPT | Performed by: FAMILY MEDICINE

## 2024-05-15 PROCEDURE — 80053 COMPREHEN METABOLIC PANEL: CPT | Performed by: FAMILY MEDICINE

## 2024-05-15 PROCEDURE — 82306 VITAMIN D 25 HYDROXY: CPT | Performed by: FAMILY MEDICINE

## 2024-05-15 PROCEDURE — 99204 OFFICE O/P NEW MOD 45 MIN: CPT | Performed by: FAMILY MEDICINE

## 2024-05-15 RX ORDER — ALBUTEROL SULFATE 90 UG/1
2 AEROSOL, METERED RESPIRATORY (INHALATION) 4 TIMES DAILY
COMMUNITY
Start: 2024-02-21

## 2024-05-15 RX ORDER — FLUTICASONE PROPIONATE AND SALMETEROL XINAFOATE 115; 21 UG/1; UG/1
2 AEROSOL, METERED RESPIRATORY (INHALATION) 2 TIMES DAILY
COMMUNITY
Start: 2024-02-21

## 2024-05-15 RX ORDER — ALBUTEROL SULFATE 0.83 MG/ML
2.5 SOLUTION RESPIRATORY (INHALATION) EVERY 6 HOURS PRN
Qty: 90 ML | Refills: 3 | Status: SHIPPED | OUTPATIENT
Start: 2024-05-15

## 2024-05-15 ASSESSMENT — ENCOUNTER SYMPTOMS: HYPERTENSION: 1

## 2024-05-15 ASSESSMENT — PAIN SCALES - GENERAL: PAINLEVEL: NO PAIN (0)

## 2024-05-15 NOTE — PROGRESS NOTES
"  Assessment & Plan     Vitamin D deficiency  Previously deficient  Recommend supplementing repeat labs   He does have hx of gout attack 6 months ago on allopurinol - will need to be careful not to over supplement  - Vitamin D Deficiency; Future    Benign essential hypertension  Stable on lisinopril amlodipine metoprolol succinate  - Comprehensive metabolic panel (BMP + Alb, Alk Phos, ALT, AST, Total. Bili, TP); Future    CKD (chronic kidney disease) stage 2, GFR 60-89 ml/min  Repeat to monitor  Baseline cr 1.3  - Comprehensive metabolic panel (BMP + Alb, Alk Phos, ALT, AST, Total. Bili, TP); Future    SOB (shortness of breath)  Refilled, patient would like neb solution  Pft 11/2023 with restrictive lung disease  - albuterol (PROVENTIL) (2.5 MG/3ML) 0.083% neb solution; Take 1 vial (2.5 mg) by nebulization every 6 hours as needed for shortness of breath, wheezing or cough    Chronic viral hepatitis B (H)   Cmp today    Ascending aorta dilatation (H24)   Patient follows with cardiology          BMI  Estimated body mass index is 32.77 kg/m  as calculated from the following:    Height as of 12/13/23: 1.676 m (5' 6\").    Weight as of this encounter: 92.1 kg (203 lb).             Subjective   Marcelle is a 63 year old, presenting for the following health issues:  Hypertension        5/15/2024     3:30 PM   Additional Questions   Roomed by Nataly PADILLA   Accompanied by Wife  Tia     Via the Health Maintenance questionnaire, the patient has reported the following services have been completed -Colonscopy: unknow 2024-01-12, this information has been sent to the abstraction team.  History of Present Illness       Hypertension: He presents for follow up of hypertension.  He does check blood pressure  regularly outside of the clinic. Outpatient blood pressures have not been over 140/90. He follows a low salt diet.     He eats 2-3 servings of fruits and vegetables daily.He consumes 0 sweetened beverage(s) daily.He exercises with enough " effort to increase his heart rate 30 to 60 minutes per day.  He exercises with enough effort to increase his heart rate 5 days per week.   He is taking medications regularly.                     Objective    /80   Pulse 82   Temp 97.7  F (36.5  C) (Tympanic)   Resp 16   Wt 92.1 kg (203 lb)   SpO2 95%   BMI 32.77 kg/m    Body mass index is 32.77 kg/m .  Physical Exam  Vitals reviewed.   Cardiovascular:      Rate and Rhythm: Normal rate.      Heart sounds: Murmur heard.   Pulmonary:      Effort: Pulmonary effort is normal.   Neurological:      Mental Status: He is alert.                    Signed Electronically by: Juliana Lezama MD

## 2024-05-15 NOTE — COMMUNITY RESOURCES LIST (ENGLISH)
May 15, 2024           YOUR PERSONALIZED LIST OF SERVICES & PROGRAMS           & SHELTER    Housing      [facility] - Emergency Housing  [address] [place] [address] (Distance: 19.1 miles)  Phone: [phone]  Website: http://www.[employer]  Language: English  Fee: Free      [facility] - Emergency Housing Assistance  [address] [place] [address] (Distance: 13.5 miles)  Website: https://www.[facility]/emergency-housing-  Language: English  Fee: Free      Home Health Care [facility] - [facility]  Phone: [phone]) [phone]  Website: https://www.Hailo/  Language: English, Hmong, Oromo, Mauritanian, Ukrainian  Hours: Mon 9:00 AM - 5:00 PM Tue 9:00 AM - 5:00 PM Wed 9:00 AM - 5:00 PM Thu 9:00 AM - 5:00 PM Fri 9:00 AM - 5:00 PM  Fee: Insurance  Accessibility: Blind accommodation, Deaf or hard of hearing, Translation services  Transportation Options: Free transportation    Case Management      [facility] - [facility] Entry access point  [address] [place] [address] [address] [place] [address] (Distance: 23.9 miles)  Phone: [phone]  Language: English  Fee: Free  Accessibility: [facility]      - National - Senior Care Help  [address] [place] [address] (Distance: 18.3 miles)  Phone: [phone]) [phone]  Website: https://www.careNotegraphy/how-we-help.html  Language: English  Fee: Insurance      [employer] - Housing Stabilization Services  Phone: [phone]) [phone]  Website: https://homebasemn.com/  Language: English  Hours: Mon 8:00 AM - 4:00 PM Tue 8:00 AM - 4:00 PM Wed 8:00 AM - 4:00 PM Thu 8:00 AM - 4:00 PM Fri 8:00 AM - 4:00 PM  Fee: Free  Accessibility: Blind accommodation, Deaf or hard of hearing  Transportation Options: Free transportation    Drop-In Services      [facility] Warming or cooling [facility]  [address] [place]  [address] (Distance: 21.9 miles)  Language: English  Fee: [facility] - Warming or cooling center - [facility] - [facility]  [address] [place] [address] (Distance: 24.2 miles)  Language: English  Fee: Free      [employer]  Website: http://www.laundrylove.org               IMPORTANT NUMBERS & WEBSITES        Emergency Services  911  .   [employer] Way  211 http://211unitedway.org  .   Poison Control  (835) 286-2478 http://[place]poison.org http://w[place]poison.org  .     Suicide and Crisis Lifeline  988 http://988BioAmberline.org  .   Childhelp [place] Child Abuse Hotline  504.940.6742 http://Childhelphotline.org   .   [place] Sexual Assault Hotline  (347) 250-7897 (HOPE) http://Ambient Devices.org   .     [place] Runaway Safeline  (539) 506-6124 (RUNAWAY) http://TutameeruLife360.org  .   Pregnancy & Postpartum Support  Call/text 318-642-3588  [place]: http://ppsupport[place].org  [place]: http://[employer].com/wi  .   Substance Abuse National Helpline ([facility])  905-768-HELP (7925) http://Findtreatment.gov   .                DISCLAIMER: These resources have been generated via the [employer] Platform. [employer] does not endorse any service providers mentioned in this resource list. [employer] does not guarantee that the services mentioned in this resource list will be available to you or will improve your health or wellness.    [facility]

## 2024-05-15 NOTE — NURSING NOTE
"Chief Complaint   Patient presents with    Hypertension       Initial /80   Pulse 82   Temp 97.7  F (36.5  C) (Tympanic)   Resp 16   Wt 92.1 kg (203 lb)   SpO2 95%   BMI 32.77 kg/m   Estimated body mass index is 32.77 kg/m  as calculated from the following:    Height as of 12/13/23: 1.676 m (5' 6\").    Weight as of this encounter: 92.1 kg (203 lb).    Patient presents to the clinic using No DME    Is there anyone who you would like to be able to receive your results? No  If yes have patient fill out YO      "

## 2024-05-16 LAB — VIT D+METAB SERPL-MCNC: 19 NG/ML (ref 20–50)

## 2024-07-25 ENCOUNTER — LAB (OUTPATIENT)
Dept: LAB | Facility: CLINIC | Age: 64
End: 2024-07-25
Payer: COMMERCIAL

## 2024-07-25 DIAGNOSIS — N18.2 CKD (CHRONIC KIDNEY DISEASE) STAGE 2, GFR 60-89 ML/MIN: ICD-10-CM

## 2024-07-25 DIAGNOSIS — B18.1 CHRONIC VIRAL HEPATITIS B (H): ICD-10-CM

## 2024-07-25 DIAGNOSIS — E55.9 VITAMIN D DEFICIENCY: ICD-10-CM

## 2024-07-25 LAB
ANION GAP SERPL CALCULATED.3IONS-SCNC: 10 MMOL/L (ref 7–15)
BUN SERPL-MCNC: 26.9 MG/DL (ref 8–23)
CALCIUM SERPL-MCNC: 9.4 MG/DL (ref 8.8–10.4)
CHLORIDE SERPL-SCNC: 105 MMOL/L (ref 98–107)
CREAT SERPL-MCNC: 1.57 MG/DL (ref 0.67–1.17)
EGFRCR SERPLBLD CKD-EPI 2021: 50 ML/MIN/1.73M2
GLUCOSE SERPL-MCNC: 104 MG/DL (ref 70–99)
HCO3 SERPL-SCNC: 26 MMOL/L (ref 22–29)
POTASSIUM SERPL-SCNC: 4.4 MMOL/L (ref 3.4–5.3)
SODIUM SERPL-SCNC: 141 MMOL/L (ref 135–145)
VIT D+METAB SERPL-MCNC: 28 NG/ML (ref 20–50)

## 2024-07-25 PROCEDURE — 87517 HEPATITIS B DNA QUANT: CPT

## 2024-07-25 PROCEDURE — 36415 COLL VENOUS BLD VENIPUNCTURE: CPT

## 2024-07-25 PROCEDURE — 82306 VITAMIN D 25 HYDROXY: CPT

## 2024-07-25 PROCEDURE — 80048 BASIC METABOLIC PNL TOTAL CA: CPT

## 2024-07-26 LAB
HBV DNA SERPL NAA+PROBE-ACNC: 481 IU/ML
HBV DNA SERPL NAA+PROBE-LOG IU: 2.7 {LOG_IU}/ML

## 2024-08-19 DIAGNOSIS — N18.2 CKD (CHRONIC KIDNEY DISEASE) STAGE 2, GFR 60-89 ML/MIN: ICD-10-CM

## 2024-08-19 DIAGNOSIS — B18.1 CHRONIC VIRAL HEPATITIS B (H): ICD-10-CM

## 2024-08-19 DIAGNOSIS — R73.09 ELEVATED GLUCOSE: Primary | ICD-10-CM

## 2024-10-08 DIAGNOSIS — M10.9 GOUT, UNSPECIFIED CAUSE, UNSPECIFIED CHRONICITY, UNSPECIFIED SITE: Primary | ICD-10-CM

## 2024-10-08 NOTE — TELEPHONE ENCOUNTER
Pending Prescriptions:                       Disp   Refills    allopurinol (ZYLOPRIM) 300 MG tablet                          Sig: Take 1 tablet (300 mg) by mouth 2 times daily.      Last office visit: 5/15/2024 ; last virtual visit: Visit date not found with prescribing

## 2024-10-09 NOTE — TELEPHONE ENCOUNTER
Requested Prescriptions   Pending Prescriptions Disp Refills    allopurinol (ZYLOPRIM) 300 MG tablet       Sig: Take 1 tablet (300 mg) by mouth 2 times daily.       Gout Agents Protocol Failed - 10/8/2024 11:11 AM        Failed - CBC on file in past 12 months     Recent Labs   Lab Test 04/15/22  0833   WBC 6.1   RBC 4.40   HGB 14.2   HCT 42.3                    Failed - Medication indicated for associated diagnosis     One of the following medications: colchicine is prescribed for one or more of the following conditions:     Amyloidosis   ulcer of mouth   Bechet's syndrome   Pericarditis   Peyronie's disease, psoriasis          Failed - Has GFR on file in past 12 months and most recent value is normal        Passed - ALT on file in past 12 months     Recent Labs   Lab Test 05/15/24  1600   ALT 41             Passed - Has Uric Acid on file in past 12 months and value is less than 6     Recent Labs   Lab Test 01/12/24  1455   URIC 3.2*     If level is 6mg/dL or greater, ok to refill one time and refer to provider.           Passed - Medication is active on med list        Passed - Recent (12 mo) or future (90 days) visit within the authorizing provider's specialty     The patient must have completed an in-person or virtual visit within the past 12 months or has a future visit scheduled within the next 90 days with the authorizing provider s specialty.  Urgent care and e-visits do not quality as an office visit for this protocol.          Passed - Patient is age 18 or older     Refill protocol is for patient's aged 18 years and older            Medication is patient reported.       Last office visit: 5/15/2024 ; last virtual visit: Visit date not found with prescribing provider:     Future Office Visit:      Julie Behrendt RN

## 2024-10-11 RX ORDER — ALLOPURINOL 300 MG/1
300 TABLET ORAL 2 TIMES DAILY
Qty: 90 TABLET | Refills: 0 | Status: SHIPPED | OUTPATIENT
Start: 2024-10-11

## 2024-11-18 ENCOUNTER — ANCILLARY PROCEDURE (OUTPATIENT)
Dept: ULTRASOUND IMAGING | Facility: CLINIC | Age: 64
End: 2024-11-18
Attending: INTERNAL MEDICINE
Payer: COMMERCIAL

## 2024-11-18 DIAGNOSIS — B18.1 CHRONIC HEPATITIS B (H): ICD-10-CM

## 2024-11-18 PROCEDURE — 76705 ECHO EXAM OF ABDOMEN: CPT | Mod: TC | Performed by: RADIOLOGY

## 2025-03-05 DIAGNOSIS — M10.9 GOUT, UNSPECIFIED CAUSE, UNSPECIFIED CHRONICITY, UNSPECIFIED SITE: ICD-10-CM

## 2025-03-05 RX ORDER — ALLOPURINOL 300 MG/1
1 TABLET ORAL 2 TIMES DAILY
Qty: 90 TABLET | Refills: 0 | OUTPATIENT
Start: 2025-03-05

## 2025-03-05 NOTE — TELEPHONE ENCOUNTER
Notified patient and he stated that they have switched PCP and no longer with Jonna Frausto/ Patient

## 2025-03-05 NOTE — TELEPHONE ENCOUNTER
Overdue for needed care. Please call to schedule in person clinic appointment. Once appt is scheduled, route back to med refill pool.

## (undated) DEVICE — SHEATH GLIDE RADIAL 5FR 10CM .021

## (undated) DEVICE — CATH GUIDING 5FR X 100CM LA5PAPA1

## (undated) DEVICE — ELECTRODE ADULT PACING MULTI P-211-M1

## (undated) DEVICE — MANIFOLD KIT ANGIO AUTOMATED 014613

## (undated) DEVICE — SYR ANGIOGRAPHY MULTIUSE KIT ACIST 014612

## (undated) DEVICE — SLEEVE TR BAND RADIAL COMPRESSION DEVICE 24CM TRB24-REG

## (undated) DEVICE — KIT HAND CONTROL ACIST 014644 AR-P54

## (undated) DEVICE — INTRO MICRO MINI STICK 4FR STIFF NITINOL 45-753

## (undated) DEVICE — CUSTOM PACK CORONARY SAN5BCRHEA

## (undated) RX ORDER — ASPIRIN 325 MG
TABLET ORAL
Status: DISPENSED
Start: 2022-04-15

## (undated) RX ORDER — FENTANYL CITRATE 50 UG/ML
INJECTION, SOLUTION INTRAMUSCULAR; INTRAVENOUS
Status: DISPENSED
Start: 2022-04-15

## (undated) RX ORDER — DIAZEPAM 5 MG
TABLET ORAL
Status: DISPENSED
Start: 2022-04-15